# Patient Record
Sex: FEMALE | Race: WHITE | NOT HISPANIC OR LATINO | URBAN - METROPOLITAN AREA
[De-identification: names, ages, dates, MRNs, and addresses within clinical notes are randomized per-mention and may not be internally consistent; named-entity substitution may affect disease eponyms.]

---

## 2018-04-24 ENCOUNTER — INPATIENT (INPATIENT)
Facility: HOSPITAL | Age: 48
LOS: 2 days | Discharge: HOME | End: 2018-04-27
Attending: FAMILY MEDICINE | Admitting: FAMILY MEDICINE
Payer: COMMERCIAL

## 2018-04-24 VITALS — WEIGHT: 240.08 LBS | HEIGHT: 69 IN

## 2018-04-24 LAB
ADD ON TEST-SPECIMEN IN LAB: SIGNIFICANT CHANGE UP
ALBUMIN SERPL ELPH-MCNC: 4.2 G/DL — SIGNIFICANT CHANGE UP (ref 3.3–5)
ALP SERPL-CCNC: 87 U/L — SIGNIFICANT CHANGE UP (ref 40–120)
ALT FLD-CCNC: 49 U/L — SIGNIFICANT CHANGE UP (ref 12–78)
ANION GAP SERPL CALC-SCNC: 10 MMOL/L — SIGNIFICANT CHANGE UP (ref 5–17)
APTT BLD: 33.8 SEC — SIGNIFICANT CHANGE UP (ref 27.5–37.4)
AST SERPL-CCNC: 46 U/L — HIGH (ref 15–37)
BASOPHILS # BLD AUTO: 0.04 K/UL — SIGNIFICANT CHANGE UP (ref 0–0.2)
BASOPHILS NFR BLD AUTO: 0.4 % — SIGNIFICANT CHANGE UP (ref 0–2)
BILIRUB SERPL-MCNC: 0.4 MG/DL — SIGNIFICANT CHANGE UP (ref 0.2–1.2)
BUN SERPL-MCNC: 15 MG/DL — SIGNIFICANT CHANGE UP (ref 7–23)
CALCIUM SERPL-MCNC: 9.8 MG/DL — SIGNIFICANT CHANGE UP (ref 8.5–10.1)
CHLORIDE SERPL-SCNC: 106 MMOL/L — SIGNIFICANT CHANGE UP (ref 96–108)
CK SERPL-CCNC: 104 U/L — SIGNIFICANT CHANGE UP (ref 26–192)
CO2 SERPL-SCNC: 26 MMOL/L — SIGNIFICANT CHANGE UP (ref 22–31)
CREAT SERPL-MCNC: 1.43 MG/DL — HIGH (ref 0.5–1.3)
D DIMER BLD IA.RAPID-MCNC: <150 NG/ML DDU — SIGNIFICANT CHANGE UP
EOSINOPHIL # BLD AUTO: 0.08 K/UL — SIGNIFICANT CHANGE UP (ref 0–0.5)
EOSINOPHIL NFR BLD AUTO: 0.8 % — SIGNIFICANT CHANGE UP (ref 0–6)
GLUCOSE SERPL-MCNC: 124 MG/DL — HIGH (ref 70–99)
HCT VFR BLD CALC: 41.3 % — SIGNIFICANT CHANGE UP (ref 34.5–45)
HGB BLD-MCNC: 13.7 G/DL — SIGNIFICANT CHANGE UP (ref 11.5–15.5)
IMM GRANULOCYTES NFR BLD AUTO: 0.4 % — SIGNIFICANT CHANGE UP (ref 0–1.5)
INR BLD: 1.02 RATIO — SIGNIFICANT CHANGE UP (ref 0.88–1.16)
LYMPHOCYTES # BLD AUTO: 2.98 K/UL — SIGNIFICANT CHANGE UP (ref 1–3.3)
LYMPHOCYTES # BLD AUTO: 28.1 % — SIGNIFICANT CHANGE UP (ref 13–44)
MAGNESIUM SERPL-MCNC: 2 MG/DL — SIGNIFICANT CHANGE UP (ref 1.6–2.6)
MCHC RBC-ENTMCNC: 30.5 PG — SIGNIFICANT CHANGE UP (ref 27–34)
MCHC RBC-ENTMCNC: 33.2 GM/DL — SIGNIFICANT CHANGE UP (ref 32–36)
MCV RBC AUTO: 92 FL — SIGNIFICANT CHANGE UP (ref 80–100)
MONOCYTES # BLD AUTO: 1.01 K/UL — HIGH (ref 0–0.9)
MONOCYTES NFR BLD AUTO: 9.5 % — SIGNIFICANT CHANGE UP (ref 2–14)
NEUTROPHILS # BLD AUTO: 6.47 K/UL — SIGNIFICANT CHANGE UP (ref 1.8–7.4)
NEUTROPHILS NFR BLD AUTO: 60.8 % — SIGNIFICANT CHANGE UP (ref 43–77)
NRBC # BLD: 0 /100 WBCS — SIGNIFICANT CHANGE UP (ref 0–0)
PLATELET # BLD AUTO: 262 K/UL — SIGNIFICANT CHANGE UP (ref 150–400)
POTASSIUM SERPL-MCNC: 3.6 MMOL/L — SIGNIFICANT CHANGE UP (ref 3.5–5.3)
POTASSIUM SERPL-SCNC: 3.6 MMOL/L — SIGNIFICANT CHANGE UP (ref 3.5–5.3)
PROT SERPL-MCNC: 8.7 GM/DL — HIGH (ref 6–8.3)
PROTHROM AB SERPL-ACNC: 11 SEC — SIGNIFICANT CHANGE UP (ref 9.8–12.7)
RBC # BLD: 4.49 M/UL — SIGNIFICANT CHANGE UP (ref 3.8–5.2)
RBC # FLD: 12.4 % — SIGNIFICANT CHANGE UP (ref 10.3–14.5)
SODIUM SERPL-SCNC: 142 MMOL/L — SIGNIFICANT CHANGE UP (ref 135–145)
TROPONIN I SERPL-MCNC: 0.3 NG/ML — HIGH (ref 0.01–0.04)
TROPONIN I SERPL-MCNC: <0.015 NG/ML — SIGNIFICANT CHANGE UP (ref 0.01–0.04)
TSH SERPL-MCNC: 4.27 UU/ML — HIGH (ref 0.36–3.74)
WBC # BLD: 10.62 K/UL — HIGH (ref 3.8–10.5)
WBC # FLD AUTO: 10.62 K/UL — HIGH (ref 3.8–10.5)

## 2018-04-24 PROCEDURE — 71045 X-RAY EXAM CHEST 1 VIEW: CPT | Mod: 26

## 2018-04-24 PROCEDURE — 99291 CRITICAL CARE FIRST HOUR: CPT

## 2018-04-24 PROCEDURE — 93010 ELECTROCARDIOGRAM REPORT: CPT | Mod: 76

## 2018-04-24 RX ORDER — METOPROLOL TARTRATE 50 MG
0 TABLET ORAL
Qty: 0 | Refills: 0 | COMMUNITY

## 2018-04-24 RX ORDER — ACETAMINOPHEN 500 MG
650 TABLET ORAL EVERY 6 HOURS
Qty: 0 | Refills: 0 | Status: DISCONTINUED | OUTPATIENT
Start: 2018-04-24 | End: 2018-04-27

## 2018-04-24 RX ORDER — SODIUM CHLORIDE 9 MG/ML
1000 INJECTION INTRAMUSCULAR; INTRAVENOUS; SUBCUTANEOUS
Qty: 0 | Refills: 0 | Status: DISCONTINUED | OUTPATIENT
Start: 2018-04-24 | End: 2018-04-26

## 2018-04-24 RX ORDER — ASPIRIN/CALCIUM CARB/MAGNESIUM 324 MG
81 TABLET ORAL DAILY
Qty: 0 | Refills: 0 | Status: DISCONTINUED | OUTPATIENT
Start: 2018-04-24 | End: 2018-04-27

## 2018-04-24 RX ORDER — SODIUM CHLORIDE 9 MG/ML
3 INJECTION INTRAMUSCULAR; INTRAVENOUS; SUBCUTANEOUS ONCE
Qty: 0 | Refills: 0 | Status: COMPLETED | OUTPATIENT
Start: 2018-04-24 | End: 2018-04-24

## 2018-04-24 RX ORDER — ADENOSINE 3 MG/ML
6 INJECTION INTRAVENOUS ONCE
Qty: 0 | Refills: 0 | Status: COMPLETED | OUTPATIENT
Start: 2018-04-24 | End: 2018-04-24

## 2018-04-24 RX ADMIN — SODIUM CHLORIDE 3 MILLILITER(S): 9 INJECTION INTRAMUSCULAR; INTRAVENOUS; SUBCUTANEOUS at 17:38

## 2018-04-24 RX ADMIN — ADENOSINE 6 MILLIGRAM(S): 3 INJECTION INTRAVENOUS at 16:55

## 2018-04-24 NOTE — ED ADULT TRIAGE NOTE - CHIEF COMPLAINT QUOTE
pt states she has been in afib with rapid rate since approx 1430 today w/o relief. states weakness. no CP. HR in triage 202. pt upgraded.

## 2018-04-24 NOTE — H&P ADULT - NSHPPHYSICALEXAM_GEN_ALL_CORE
Vital Signs Last 24 Hrs  T(C): 36.9 (24 Apr 2018 23:06), Max: 37.1 (24 Apr 2018 16:43)  T(F): 98.5 (24 Apr 2018 23:06), Max: 98.8 (24 Apr 2018 19:17)  HR: 86 (24 Apr 2018 23:06) (85 - 190)  BP: 114/73 (24 Apr 2018 23:06) (111/65 - 134/90)  BP(mean): --  RR: 18 (24 Apr 2018 23:06) (16 - 18)  SpO2: 100% (24 Apr 2018 23:06) (98% - 100%)    GEN: appears comfortable  Neuro: AAOx3, nonfocal  HEENT: NC/AT, EOMI  Neck: no thyroidmegaly, no JVD  Cardiovascular: S1S2 present, regular rhythm, no murmur  Respiratory: breath sounds normal bilaterally, no wheezing, no rales, no rhonchi  Gastrointestinal: bowel sounds normal, soft, no abdominal tenderness  Musculoskeletal: no muscle tenderness  Extremities: No edema  Skin: No rash

## 2018-04-24 NOTE — ED ADULT NURSE REASSESSMENT NOTE - NS ED NURSE REASSESS COMMENT FT1
pt resting comfortably in bed with no acute distress noted. vss. awaiting for hospitalist. Will cont to monitor for safety and comfort.

## 2018-04-24 NOTE — ED PROVIDER NOTE - CRITICAL CARE PROVIDED
interpretation of diagnostic studies/direct patient care (not related to procedure)/documentation/additional history taking

## 2018-04-24 NOTE — H&P ADULT - ASSESSMENT
48 y.o. female with PMH AFib with ablation in 1990s, hx endometriosis s/p L ovary removal for chocolate cyst age 41 presents with palpitations. Pt is physical  and was throwing the ball when the palpitation started. Pt reports usually self resolves, but this time, it continued even while in the ED. Pt reports feeling lightheadedness during this time. Denies fever, chills, sore throat, abd pain, dysuria or diarrhea. Reports associated SOB and chest tightness. Currently, denies symptoms. Pt drinks 2 cups coffee daily.    #palpitations due to SVT  #hx atrial fibrillation  #elevated troponin likely demand ischemia from palpitation  -admit to tele  -serial cardiac enzymes and EKG  -echo  -NPO except med. ?stress test vs cath  -check thyroid panel  -CHADSVASc 1, ASA  -cardio consult, Dr Mireles    #LOIDA vs CKD  -Cr normal back in 2012  -iv fluid  -monitor renal function    #DVT ppx  -SCDs
DISCHARGE

## 2018-04-24 NOTE — ED PROVIDER NOTE - CARE PLAN
Principal Discharge DX:	SVT (supraventricular tachycardia)  Secondary Diagnosis:	Troponin level elevated

## 2018-04-24 NOTE — H&P ADULT - HISTORY OF PRESENT ILLNESS
48 y.o. female with PMH AFib with ablation in 1990s, hx endometriosis s/p L ovary removal for chocolate cyst age 41 presents with palpitations. Pt is physical  and was throwing the ball when the palpitation started. Pt reports usually self resolves, but this time, it continued even while in the ED. Pt reports feeling lightheadedness during this time. Denies fever, chills, sore throat, abd pain, dysuria or diarrhea. Reports associated SOB and chest tightness. Currently, denies symptoms. Pt drinks 2 cups coffee daily.    PMH: as above  PSH: as above  Social Hx: denies tobacco, 4-5 drinks  Family Hx: Father-aortic dissection; Mother-MI, TIA, RA  ROS: per HPI

## 2018-04-24 NOTE — ED ADULT NURSE NOTE - OBJECTIVE STATEMENT
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  Pt describes hx rapid afib and had ablation in early 1990's.  Pt states most recent episode 6 months ago, spontaneously converted while at work.  Pt reports this episode onset 2 hours prior to arrival to ED.  Pt describes slight sob, light headedness and chest pressure.  Pt hr 190's upon arrival to ED.

## 2018-04-25 DIAGNOSIS — I48.91 UNSPECIFIED ATRIAL FIBRILLATION: ICD-10-CM

## 2018-04-25 DIAGNOSIS — R74.8 ABNORMAL LEVELS OF OTHER SERUM ENZYMES: ICD-10-CM

## 2018-04-25 DIAGNOSIS — Z29.9 ENCOUNTER FOR PROPHYLACTIC MEASURES, UNSPECIFIED: ICD-10-CM

## 2018-04-25 DIAGNOSIS — I47.1 SUPRAVENTRICULAR TACHYCARDIA: ICD-10-CM

## 2018-04-25 LAB
ANION GAP SERPL CALC-SCNC: 7 MMOL/L — SIGNIFICANT CHANGE UP (ref 5–17)
BASOPHILS # BLD AUTO: 0.03 K/UL — SIGNIFICANT CHANGE UP (ref 0–0.2)
BASOPHILS NFR BLD AUTO: 0.5 % — SIGNIFICANT CHANGE UP (ref 0–2)
BUN SERPL-MCNC: 12 MG/DL — SIGNIFICANT CHANGE UP (ref 7–23)
CALCIUM SERPL-MCNC: 9 MG/DL — SIGNIFICANT CHANGE UP (ref 8.5–10.1)
CHLORIDE SERPL-SCNC: 110 MMOL/L — HIGH (ref 96–108)
CHOLEST SERPL-MCNC: 192 MG/DL — SIGNIFICANT CHANGE UP (ref 10–199)
CO2 SERPL-SCNC: 27 MMOL/L — SIGNIFICANT CHANGE UP (ref 22–31)
CREAT SERPL-MCNC: 1.08 MG/DL — SIGNIFICANT CHANGE UP (ref 0.5–1.3)
EOSINOPHIL # BLD AUTO: 0.13 K/UL — SIGNIFICANT CHANGE UP (ref 0–0.5)
EOSINOPHIL NFR BLD AUTO: 2.4 % — SIGNIFICANT CHANGE UP (ref 0–6)
GLUCOSE SERPL-MCNC: 98 MG/DL — SIGNIFICANT CHANGE UP (ref 70–99)
HCT VFR BLD CALC: 37.5 % — SIGNIFICANT CHANGE UP (ref 34.5–45)
HDLC SERPL-MCNC: 80 MG/DL — SIGNIFICANT CHANGE UP (ref 40–125)
HGB BLD-MCNC: 12.3 G/DL — SIGNIFICANT CHANGE UP (ref 11.5–15.5)
IMM GRANULOCYTES NFR BLD AUTO: 0 % — SIGNIFICANT CHANGE UP (ref 0–1.5)
LIPID PNL WITH DIRECT LDL SERPL: 101 MG/DL — SIGNIFICANT CHANGE UP
LYMPHOCYTES # BLD AUTO: 2.07 K/UL — SIGNIFICANT CHANGE UP (ref 1–3.3)
LYMPHOCYTES # BLD AUTO: 37.8 % — SIGNIFICANT CHANGE UP (ref 13–44)
MCHC RBC-ENTMCNC: 30.1 PG — SIGNIFICANT CHANGE UP (ref 27–34)
MCHC RBC-ENTMCNC: 32.8 GM/DL — SIGNIFICANT CHANGE UP (ref 32–36)
MCV RBC AUTO: 91.7 FL — SIGNIFICANT CHANGE UP (ref 80–100)
MONOCYTES # BLD AUTO: 0.67 K/UL — SIGNIFICANT CHANGE UP (ref 0–0.9)
MONOCYTES NFR BLD AUTO: 12.2 % — SIGNIFICANT CHANGE UP (ref 2–14)
NEUTROPHILS # BLD AUTO: 2.57 K/UL — SIGNIFICANT CHANGE UP (ref 1.8–7.4)
NEUTROPHILS NFR BLD AUTO: 47.1 % — SIGNIFICANT CHANGE UP (ref 43–77)
NRBC # BLD: 0 /100 WBCS — SIGNIFICANT CHANGE UP (ref 0–0)
PLATELET # BLD AUTO: 220 K/UL — SIGNIFICANT CHANGE UP (ref 150–400)
POTASSIUM SERPL-MCNC: 4 MMOL/L — SIGNIFICANT CHANGE UP (ref 3.5–5.3)
POTASSIUM SERPL-SCNC: 4 MMOL/L — SIGNIFICANT CHANGE UP (ref 3.5–5.3)
RBC # BLD: 4.09 M/UL — SIGNIFICANT CHANGE UP (ref 3.8–5.2)
RBC # FLD: 12.7 % — SIGNIFICANT CHANGE UP (ref 10.3–14.5)
SODIUM SERPL-SCNC: 144 MMOL/L — SIGNIFICANT CHANGE UP (ref 135–145)
T3 SERPL-MCNC: 98 NG/DL — SIGNIFICANT CHANGE UP (ref 80–200)
T4 AB SER-ACNC: 5.9 UG/DL — SIGNIFICANT CHANGE UP (ref 4.6–12)
TOTAL CHOLESTEROL/HDL RATIO MEASUREMENT: 2.4 RATIO — LOW (ref 3.3–7.1)
TRIGL SERPL-MCNC: 53 MG/DL — SIGNIFICANT CHANGE UP (ref 10–149)
TROPONIN I SERPL-MCNC: 0.23 NG/ML — HIGH (ref 0.01–0.04)
TROPONIN I SERPL-MCNC: 0.39 NG/ML — HIGH (ref 0.01–0.04)
WBC # BLD: 5.47 K/UL — SIGNIFICANT CHANGE UP (ref 3.8–10.5)
WBC # FLD AUTO: 5.47 K/UL — SIGNIFICANT CHANGE UP (ref 3.8–10.5)

## 2018-04-25 PROCEDURE — 93306 TTE W/DOPPLER COMPLETE: CPT | Mod: 26

## 2018-04-25 PROCEDURE — 99223 1ST HOSP IP/OBS HIGH 75: CPT

## 2018-04-25 PROCEDURE — 93010 ELECTROCARDIOGRAM REPORT: CPT

## 2018-04-25 RX ORDER — ONDANSETRON 8 MG/1
4 TABLET, FILM COATED ORAL EVERY 6 HOURS
Qty: 0 | Refills: 0 | Status: DISCONTINUED | OUTPATIENT
Start: 2018-04-25 | End: 2018-04-25

## 2018-04-25 RX ADMIN — Medication 650 MILLIGRAM(S): at 17:34

## 2018-04-25 RX ADMIN — SODIUM CHLORIDE 100 MILLILITER(S): 9 INJECTION INTRAMUSCULAR; INTRAVENOUS; SUBCUTANEOUS at 01:02

## 2018-04-25 RX ADMIN — Medication 81 MILLIGRAM(S): at 11:21

## 2018-04-25 RX ADMIN — SODIUM CHLORIDE 100 MILLILITER(S): 9 INJECTION INTRAMUSCULAR; INTRAVENOUS; SUBCUTANEOUS at 11:20

## 2018-04-25 NOTE — PROGRESS NOTE ADULT - ASSESSMENT
48 y.o. female with palpitations due to SVT    -echo  -NPO except med. ?stress test vs cath  -check thyroid panel  -CHADSVASc 1, ASA  -cardio consult, Dr Mireles    #LOIDA vs CKD  -Cr normal back in 2012  -iv fluid  -monitor renal function 48 y.o. female with palpitations due to SVT

## 2018-04-25 NOTE — PROGRESS NOTE ADULT - SUBJECTIVE AND OBJECTIVE BOX
CHIEF COMPLAINT: palpitations    SUBJECTIVE: 49yo F PMHx AFib with ablation in 1990s, hx endometriosis s/p L ovary removal for chocolate cyst age 41 presents with palpitations. Pt is physical  and was throwing the ball when the palpitation started. Pt reports usually self resolves, but this time, it continued even while in the ED. Pt reports feeling lightheadedness during this time. Denies fever, chills, sore throat, abd pain, dysuria or diarrhea. Reports associated SOB and chest tightness. Currently, denies symptoms. Pt drinks 2 cups coffee daily.    4/25/18 - Patient seen and examined at beside. No complaints, no longer having palpitations since admission. Admits no chest pain, sob, WYLIE, just a prolonged episode of palpitations. Patient has had episodes in the past, usually last under a minute and self resolve. No n/v, abdominal pain, weakness, fevers/chills. Case d/w Dr. Mireles, believes this patient is having SVT, will check TFT's and lyme titers. Echo today, f/u official report, cardio ablation tomorrow, NPO after midnight.    REVIEW OF SYSTEMS:    CONSTITUTIONAL: No weakness, fevers or chills  EYES/ENT: No visual changes;  No vertigo or throat pain   NECK: No pain or stiffness  RESPIRATORY: No cough, wheezing, hemoptysis; No shortness of breath  CARDIOVASCULAR: palpitations +, No chest pain  GASTROINTESTINAL: No abdominal or epigastric pain. No nausea, vomiting, or hematemesis; No diarrhea or constipation. No melena or hematochezia.  GENITOURINARY: No dysuria, frequency or hematuria  NEUROLOGICAL: No numbness or weakness  SKIN: No itching, burning, rashes, or lesions   All other review of systems is negative unless indicated above    Vital Signs Last 24 Hrs  T(C): 37.1 (25 Apr 2018 10:27), Max: 37.1 (24 Apr 2018 16:43)  T(F): 98.7 (25 Apr 2018 10:27), Max: 98.8 (24 Apr 2018 19:17)  HR: 84 (25 Apr 2018 10:27) (76 - 190)  BP: 118/62 (25 Apr 2018 10:27) (111/65 - 134/90)  BP(mean): --  RR: 18 (25 Apr 2018 10:27) (16 - 18)  SpO2: 94% (25 Apr 2018 10:27) (94% - 100%)    I&O's Summary      CAPILLARY BLOOD GLUCOSE          PHYSICAL EXAM:    Constitutional: NAD, awake and alert, well-developed  HEENT: PERR, EOMI, Normal Hearing, MMM  Neck: Soft and supple, No LAD, No JVD  Respiratory: Breath sounds are clear bilaterally, No wheezing, rales or rhonchi  Cardiovascular: S1 and S2, regular rate and rhythm, no Murmurs, gallops or rubs  Gastrointestinal: Bowel Sounds present, soft, nontender, nondistended, no guarding, no rebound  Extremities: No peripheral edema  Vascular: 2+ peripheral pulses  Neurological: A/O x 3, no focal deficits  Musculoskeletal: 5/5 strength b/l upper and lower extremities  Skin: No rashes    MEDICATIONS:  MEDICATIONS  (STANDING):  aspirin  chewable 81 milliGRAM(s) Oral daily  sodium chloride 0.9%. 1000 milliLiter(s) (100 mL/Hr) IV Continuous <Continuous>      LABS: All Labs Reviewed:                        12.3   5.47  )-----------( 220      ( 25 Apr 2018 06:03 )             37.5     04-25    144  |  110<H>  |  12  ----------------------------<  98  4.0   |  27  |  1.08    Ca    9.0      25 Apr 2018 06:03  Mg     2.0     04-24    TPro  8.7<H>  /  Alb  4.2  /  TBili  0.4  /  DBili  x   /  AST  46<H>  /  ALT  49  /  AlkPhos  87  04-24    PT/INR - ( 24 Apr 2018 16:38 )   PT: 11.0 sec;   INR: 1.02 ratio         PTT - ( 24 Apr 2018 16:38 )  PTT:33.8 sec  CARDIAC MARKERS ( 25 Apr 2018 06:03 )  0.234 ng/mL / x     / x     / x     / x      CARDIAC MARKERS ( 24 Apr 2018 23:56 )  0.385 ng/mL / x     / x     / x     / x      CARDIAC MARKERS ( 24 Apr 2018 19:54 )  0.296 ng/mL / x     / x     / x     / x      CARDIAC MARKERS ( 24 Apr 2018 16:38 )  <0.015 ng/mL / x     / 104 U/L / x     / x        Troponin I, Serum: 0.234: High Sensitivity Troponin and new reference  range effective 7/6/2016 ng/mL (04.25.18 @ 06:03)    Troponin I, Serum: 0.385: High Sensitivity Troponin and new reference  range effective 7/6/2016 ng/mL (04.24.18 @ 23:56)    Troponin I, Serum: 0.296: called inez lemus for first high tropi  04/24/18 20:37  High Sensitivity Troponin and new reference  range effective 7/6/2016 ng/mL (04.24.18 @ 19:54)      < from: Transthoracic Echocardiogram (04.25.18 @ 08:42) >  Normal appearing mitral valve structure and function.   Trace mitral regurgitation is present.   Normal appearing tricuspid valve structure and function.   Trace tricuspid valve regurgitation is present.   The left ventricle is normal in size, wall thickness, wall motion and   contractility.   Estimated left ventricular ejection fraction is 60-65 %.      < end of copied text >

## 2018-04-25 NOTE — CONSULT NOTE ADULT - SUBJECTIVE AND OBJECTIVE BOX
CHIEF COMPLAINT:  Patient is a 48y old  Female who presents with a chief complaint of palpitations (2018 23:52)      HPI: 18:  48 y.o. female well known to me with PMH SVT's with A-V armand ablation in  at [facility], hx endometriosis s/p L ovary removal for chocolate cyst at age 41, presents with palpitations.  She is physical  at the [facility] in [place] and was throwing the ball when the palpitation started.  She reports they are usually self resolves, but this time, it continued even while in the ED.  She reports feeling lightheadedness during this time. She has been getting more of these, usually lasting 20-30 minutes and resolves with either splashing water in her face and/or resting.  She denies fever, chills, sore throat, abd pain, dysuria or diarrhea.  She reports associated SOB and chest tightness with this current event. Currently, denies symptoms.  She drinks 2 cups coffee daily.  She had a normal stress echo in my office on 16 with normal LV size and systolic function and LVEF=60-65% with trace AI and mild (1+) MR and TR but no evidence for ischemia at that time.  She is now asymptomatic.  She does have Lopressor at home to use prn but has not been using it lately.    PMH: as above  PSH: as above  Social Hx: denies tobacco, 4-5 drinks  Family Hx: Father-?aortic dissection; Mother-MI, TIA, RA  ROS: per HPI (2018 23:52)        PMHx:  PAST MEDICAL & SURGICAL HISTORY:  SVT s/p AV armand ablation at [facility]-  endometrioma removal-GYN Dr ORION Lisa      ALLERGIES:  Allergies  No Known Allergies      REVIEW OF SYSTEMS:    CONSTITUTIONAL: No fevers or chills  EYES/ENT: No visual changes;  No vertigo or throat pain   NECK: No pain or stiffness  RESPIRATORY: No cough, wheezing, hemoptysis; No shortness of breath  GASTROINTESTINAL: No abdominal or epigastric pain. No nausea, vomiting, or hematemesis; No diarrhea or constipation. No melena or hematochezia.  GENITOURINARY: No dysuria, frequency or hematuria  NEUROLOGICAL: No numbness  SKIN: No itching, burning, rashes, or lesions   All other review of systems is negative unless indicated above    Vital Signs Last 24 Hrs  T(C): 36.7 (2018 04:49), Max: 37.1 (2018 16:43)  T(F): 98.1 (2018 04:49), Max: 98.8 (2018 19:17)  HR: 76 (2018 04:49) (76 - 190)  BP: 122/78 (2018 04:49) (111/65 - 134/90)  BP(mean): --  RR: 18 (2018 04:49) (16 - 18)  SpO2: 100% (2018 04:49) (97% - 100%)        PHYSICAL EXAM:   Constitutional: NAD, awake and alert, well-developed  HEENT: PERR, EOMI, Normal Hearing, MMM  Neck: Soft and supple, No LAD, No JVD  Respiratory: Breath sounds are clear bilaterally, No wheezing, rales or rhonchi  Cardiovascular: S1 and S2, regular rate and rhythm, soft LONG at LLSB and base as before, no gallops or rubs  Gastrointestinal: Bowel Sounds present, soft, nontender, nondistended, no guarding, no rebound  Extremities: No peripheral edema  Vascular: 2+ peripheral pulses  Neurological: A/O x 3, no focal deficits  Musculoskeletal: 5/5 strength b/l upper and lower extremities  Skin: No rashes      MEDICATIONS  (STANDING):  aspirin  chewable 81 milliGRAM(s) Oral daily  sodium chloride 0.9%. 1000 milliLiter(s) (100 mL/Hr) IV Continuous <Continuous>      LABS: All Labs Reviewed:                        12.3   5.47  )-----------( 220      ( 2018 06:03 )             37.5     04-25    144  |  110<H>  |  12  ----------------------------<  98  4.0   |  27  |  1.08    Ca    9.0      2018 06:03  Mg     2.0     04-24    TPro  8.7<H>  /  Alb  4.2  /  TBili  0.4  /  DBili  x   /  AST  46<H>  /  ALT  49  /  AlkPhos  87  04-24    PT/INR - ( 2018 16:38 )   PT: 11.0 sec;   INR: 1.02 ratio         PTT - ( 2018 16:38 )  PTT:33.8 sec  CARDIAC MARKERS ( 2018 06:03 )  0.234 ng/mL / x     / x     / x     / x      CARDIAC MARKERS ( 2018 23:56 )  0.385 ng/mL / x     / x     / x     / x      CARDIAC MARKERS ( 2018 19:54 )  0.296 ng/mL / x     / x     / x     / x      CARDIAC MARKERS ( 2018 16:38 )  <0.015 ng/mL / x     / 104 U/L / x     / x            BLOOD CULTURES:   LIPID PROFILE     RADIOLOGY:      EK18 @ 17:09:15 (after IV Adenosine)  NSR, WNL    18 @16:36;18  SVT with NSSTTwave changes    TELEMETRY:  SVT=>NSR    ECHO:  Normal stress echo in my office on 16 with normal LV size and systolic function and LVEF=60-65% with trace AI and mild (1+) MR and TR but no evidence for ischemia at that time.

## 2018-04-25 NOTE — PROGRESS NOTE ADULT - PROBLEM SELECTOR PLAN 1
Currently NSR on tele    -F/u Lyme Titers  -TFT's within normal limit, elevated TSH  -Cardio note appreciated, Cardioablation tomorrow  -Echocardiogram grossly normal Currently NSR on tele    -F/u Lyme Titers  -TFT's within normal limit, elevated TSH  -Cardio note appreciated, Cardioablation tomorrow  -EP note appreciated, NO AVN blockers prior to ablation  -Echocardiogram grossly normal

## 2018-04-25 NOTE — CONSULT NOTE ADULT - SUBJECTIVE AND OBJECTIVE BOX
HPI:  48 y.o. female with PMH of SVT s/p ablation in 1990s, hx endometriosis s/p L ovary removal presents with palpitations lasting approximately 2 hours. Pt is physical  and was throwing the ball when the palpitations started. Pt reports usually self resolves after 20-30 min, but this time, it continued even while in the ED. Pt reports feeling lightheadedness during this time. Reports associated SOB and chest tightness.  Denies fever, chills, sore throat, abd pain, dysuria or diarrhea.  Currently, denies symptoms. Pt drinks 2 cups coffee daily.    4/25/18: EP asked to consult with pt for recurrent SVT. In ER SVT to SR after Adenosine 6 mg IVP given.    TELE: currently SR 70-90  EKG:      PMH: as above  PSH: as above  Social Hx: denies tobacco, 4-5 drinks  Family Hx: Father-aortic dissection; Mother-MI, TIA, RA      MEDICATIONS  (STANDING):  aspirin  chewable 81 milliGRAM(s) Oral daily  sodium chloride 0.9%. 1000 milliLiter(s) (100 mL/Hr) IV Continuous <Continuous>    MEDICATIONS  (PRN):  acetaminophen   Tablet 650 milliGRAM(s) Oral every 6 hours PRN For Temp greater than 38 C (100.4 F), mild pain, HA      Allergies    No Known Allergies        Vital Signs Last 24 Hrs  T(C): 36.7 (25 Apr 2018 04:49), Max: 37.1 (24 Apr 2018 16:43)  T(F): 98.1 (25 Apr 2018 04:49), Max: 98.8 (24 Apr 2018 19:17)  HR: 76 (25 Apr 2018 04:49) (76 - 190)  BP: 122/78 (25 Apr 2018 04:49) (111/65 - 134/90)  BP(mean): --  RR: 18 (25 Apr 2018 04:49) (16 - 18)  SpO2: 100% (25 Apr 2018 04:49) (97% - 100%)    REVIEW OF SYSTEMS:    CONSTITUTIONAL:  As per HPI.  HEENT:  Eyes:  No diplopia or blurred vision. ENT:  No earache, sore throat or runny nose.  CARDIOVASCULAR:  No pressure, squeezing, strangling, tightness, heaviness or aching about the chest, neck, axilla or epigastrium.  RESPIRATORY:  No cough, shortness of breath, PND or orthopnea.  GASTROINTESTINAL:  No nausea, vomiting or diarrhea.  GENITOURINARY:  No dysuria, frequency or urgency.  MUSCULOSKELETAL:  As per HPI.  SKIN:  No change in skin, hair or nails.  NEUROLOGIC:  No paresthesias, fasciculations, seizures or weakness.  PSYCHIATRIC:  No disorder of thought or mood.  ENDOCRINE:  No heat or cold intolerance, polyuria or polydipsia.  HEMATOLOGICAL:  No easy bruising or bleedings:  .     PHYSICAL EXAMINATION:    GENERAL APPEARANCE:  Pt. is not currently dyspneic, in no distress. Pt. is alert, oriented, and pleasant.  HEENT:  NC/AT, Pupils are normal and react normally. No icterus. Mucous membranes well colored.  NECK:  Supple. No lymphadenopathy. Jugular venous pressure not elevated. Carotids equal.   HEART:   The cardiac impulse has a normal quality. There are no murmurs, rubs or gallops noted  CHEST:  Chest is clear to auscultation. Normal respiratory effort.  ABDOMEN:  Soft and nontender.   EXTREMITIES:  There is no edema.   SKIN:  No rash or significant lesions are noted.    LABS:                        12.3   5.47  )-----------( 220      ( 25 Apr 2018 06:03 )             37.5     04-25    144  |  110<H>  |  12  ----------------------------<  98  4.0   |  27  |  1.08    Ca    9.0      25 Apr 2018 06:03  Mg     2.0     04-24    TPro  8.7<H>  /  Alb  4.2  /  TBili  0.4  /  DBili  x   /  AST  46<H>  /  ALT  49  /  AlkPhos  87  04-24    LIVER FUNCTIONS - ( 24 Apr 2018 16:38 )  Alb: 4.2 g/dL / Pro: 8.7 gm/dL / ALK PHOS: 87 U/L / ALT: 49 U/L / AST: 46 U/L / GGT: x           PT/INR - ( 24 Apr 2018 16:38 )   PT: 11.0 sec;   INR: 1.02 ratio         PTT - ( 24 Apr 2018 16:38 )  PTT:33.8 sec  CARDIAC MARKERS ( 25 Apr 2018 06:03 )  0.234 ng/mL / x     / x     / x     / x      CARDIAC MARKERS ( 24 Apr 2018 23:56 )  0.385 ng/mL / x     / x     / x     / x      CARDIAC MARKERS ( 24 Apr 2018 19:54 )  0.296 ng/mL / x     / x     / x     / x      CARDIAC MARKERS ( 24 Apr 2018 16:38 )  <0.015 ng/mL / x     / 104 U/L / x     / x          Stress echo as outpatient in 8/2017: normal systolic fxn, LVEF 60-65%  Repeat 2D echo pending today. HPI:  48 y.o. female with PMH of SVT s/p ablation in 1990s, hx endometriosis s/p L ovary removal presents with palpitations lasting approximately 2 hours. Pt is physical  and was throwing the ball when the palpitations started. Pt reports usually self resolves after 20-30 min, but this time, it continued even while in the ED. Pt reports feeling lightheadedness during this time. Reports associated SOB and chest tightness.  Denies fever, chills, sore throat, abd pain, dysuria or diarrhea.  Currently, denies symptoms. Pt drinks 2 cups coffee daily.    4/25/18: EP asked to consult with pt for recurrent SVT. In ER, SVT to SR after Adenosine 6 mg IVP given.    TELE: currently SR 70-90bpm, no further SVT overnight  EKG: in ER  bpm, after adenosine, NSR 95 bpm.      PMH: as above  PSH: as above  Social Hx: denies tobacco, 4-5 drinks  Family Hx: Father-aortic dissection; Mother-MI, TIA, RA      MEDICATIONS  (STANDING):  aspirin  chewable 81 milliGRAM(s) Oral daily  sodium chloride 0.9%. 1000 milliLiter(s) (100 mL/Hr) IV Continuous <Continuous>    MEDICATIONS  (PRN):  acetaminophen   Tablet 650 milliGRAM(s) Oral every 6 hours PRN For Temp greater than 38 C (100.4 F), mild pain, HA      Allergies    No Known Allergies        Vital Signs Last 24 Hrs  T(C): 36.7 (25 Apr 2018 04:49), Max: 37.1 (24 Apr 2018 16:43)  T(F): 98.1 (25 Apr 2018 04:49), Max: 98.8 (24 Apr 2018 19:17)  HR: 76 (25 Apr 2018 04:49) (76 - 190)  BP: 122/78 (25 Apr 2018 04:49) (111/65 - 134/90)  BP(mean): --  RR: 18 (25 Apr 2018 04:49) (16 - 18)  SpO2: 100% (25 Apr 2018 04:49) (97% - 100%)    REVIEW OF SYSTEMS:    CONSTITUTIONAL:  As per HPI.  HEENT:  Eyes:  No diplopia or blurred vision. ENT:  No earache, sore throat or runny nose.  CARDIOVASCULAR:  No pressure, squeezing, strangling, tightness, heaviness or aching about the chest, neck, axilla or epigastrium.  RESPIRATORY:  No cough, shortness of breath, PND or orthopnea.  GASTROINTESTINAL:  No nausea, vomiting or diarrhea.  GENITOURINARY:  No dysuria, frequency or urgency.  MUSCULOSKELETAL:  As per HPI.  SKIN:  No change in skin, hair or nails.  NEUROLOGIC:  No paresthesias, fasciculations, seizures or weakness.  PSYCHIATRIC:  No disorder of thought or mood.  ENDOCRINE:  No heat or cold intolerance, polyuria or polydipsia.  HEMATOLOGICAL:  No easy bruising or bleedings:  .     PHYSICAL EXAMINATION:    GENERAL APPEARANCE:  Pt. is not currently dyspneic, in no distress. Pt. is alert, oriented, and pleasant.  HEENT:  NC/AT, Pupils are normal and react normally. No icterus. Mucous membranes well colored.  NECK:  Supple. No lymphadenopathy. Jugular venous pressure not elevated. Carotids equal.   HEART:   The cardiac impulse has a normal quality. There are no murmurs, rubs or gallops noted  CHEST:  Chest is clear to auscultation. Normal respiratory effort.  ABDOMEN:  Soft and nontender.   EXTREMITIES:  There is no edema.   SKIN:  No rash or significant lesions are noted.    LABS:                        12.3   5.47  )-----------( 220      ( 25 Apr 2018 06:03 )             37.5     04-25    144  |  110<H>  |  12  ----------------------------<  98  4.0   |  27  |  1.08    Ca    9.0      25 Apr 2018 06:03  Mg     2.0     04-24    TPro  8.7<H>  /  Alb  4.2  /  TBili  0.4  /  DBili  x   /  AST  46<H>  /  ALT  49  /  AlkPhos  87  04-24    LIVER FUNCTIONS - ( 24 Apr 2018 16:38 )  Alb: 4.2 g/dL / Pro: 8.7 gm/dL / ALK PHOS: 87 U/L / ALT: 49 U/L / AST: 46 U/L / GGT: x           PT/INR - ( 24 Apr 2018 16:38 )   PT: 11.0 sec;   INR: 1.02 ratio         PTT - ( 24 Apr 2018 16:38 )  PTT:33.8 sec  CARDIAC MARKERS ( 25 Apr 2018 06:03 )  0.234 ng/mL / x     / x     / x     / x      CARDIAC MARKERS ( 24 Apr 2018 23:56 )  0.385 ng/mL / x     / x     / x     / x      CARDIAC MARKERS ( 24 Apr 2018 19:54 )  0.296 ng/mL / x     / x     / x     / x      CARDIAC MARKERS ( 24 Apr 2018 16:38 )  <0.015 ng/mL / x     / 104 U/L / x     / x          Stress echo as outpatient in 8/2017: normal systolic fxn, LVEF 60-65%  Repeat 2D echo pending today.

## 2018-04-25 NOTE — CONSULT NOTE ADULT - ASSESSMENT
A/P:  Recurrent SVT with history of ablation in 1990's, pt has had increased episodes of SVT and longer in duration- she is usually able to self terminate them, rarely uses Lopressor.   2D echo pending.  Continue to monitor tele.  NPO after MN for repeat SVT ablation on Thursday.  Avoid use of any AV armand blockers prior to ablation.  Plan discussed with pt and she wishes to proceed.

## 2018-04-25 NOTE — CONSULT NOTE ADULT - ASSESSMENT
4/25/18:  Pt with above history and recurrent SVT using Lopressor prn at home but not using it lately despite more frequent but short lived SVT's.  Mild bump in Troponin most likely due to demand ischemia and not ACS.  Will repeat an echo and have EP see the pt for possible repeat SVT ablation.  Continue to avoid caffeine and will check TFT's and lyme titer (doubt).  Continue as outlined by medicine etal.  Will follow.

## 2018-04-25 NOTE — PROVIDER CONTACT NOTE (OTHER) - NAME OF MD/NP/PA/DO NOTIFIED:
EP, SPOKE WITH ABBY.
RAJESH, SPOKE WITH MD AND AWARE OF CONSULT.
CYRUS MENA
Patient was seen by Nicole Evans

## 2018-04-26 LAB
ANION GAP SERPL CALC-SCNC: 8 MMOL/L — SIGNIFICANT CHANGE UP (ref 5–17)
B BURGDOR C6 AB SER-ACNC: NEGATIVE — SIGNIFICANT CHANGE UP
B BURGDOR IGG+IGM SER-ACNC: 0.21 INDEX — SIGNIFICANT CHANGE UP (ref 0.01–0.89)
BUN SERPL-MCNC: 10 MG/DL — SIGNIFICANT CHANGE UP (ref 7–23)
CALCIUM SERPL-MCNC: 9.1 MG/DL — SIGNIFICANT CHANGE UP (ref 8.5–10.1)
CHLORIDE SERPL-SCNC: 111 MMOL/L — HIGH (ref 96–108)
CO2 SERPL-SCNC: 24 MMOL/L — SIGNIFICANT CHANGE UP (ref 22–31)
CREAT SERPL-MCNC: 0.89 MG/DL — SIGNIFICANT CHANGE UP (ref 0.5–1.3)
GLUCOSE SERPL-MCNC: 99 MG/DL — SIGNIFICANT CHANGE UP (ref 70–99)
HCG UR QL: NEGATIVE — SIGNIFICANT CHANGE UP
HCT VFR BLD CALC: 37.2 % — SIGNIFICANT CHANGE UP (ref 34.5–45)
HGB BLD-MCNC: 12.2 G/DL — SIGNIFICANT CHANGE UP (ref 11.5–15.5)
MCHC RBC-ENTMCNC: 30.3 PG — SIGNIFICANT CHANGE UP (ref 27–34)
MCHC RBC-ENTMCNC: 32.8 GM/DL — SIGNIFICANT CHANGE UP (ref 32–36)
MCV RBC AUTO: 92.3 FL — SIGNIFICANT CHANGE UP (ref 80–100)
NRBC # BLD: 0 /100 WBCS — SIGNIFICANT CHANGE UP (ref 0–0)
PLATELET # BLD AUTO: 207 K/UL — SIGNIFICANT CHANGE UP (ref 150–400)
POTASSIUM SERPL-MCNC: 4.1 MMOL/L — SIGNIFICANT CHANGE UP (ref 3.5–5.3)
POTASSIUM SERPL-SCNC: 4.1 MMOL/L — SIGNIFICANT CHANGE UP (ref 3.5–5.3)
RBC # BLD: 4.03 M/UL — SIGNIFICANT CHANGE UP (ref 3.8–5.2)
RBC # FLD: 12.5 % — SIGNIFICANT CHANGE UP (ref 10.3–14.5)
SODIUM SERPL-SCNC: 143 MMOL/L — SIGNIFICANT CHANGE UP (ref 135–145)
WBC # BLD: 5.64 K/UL — SIGNIFICANT CHANGE UP (ref 3.8–10.5)
WBC # FLD AUTO: 5.64 K/UL — SIGNIFICANT CHANGE UP (ref 3.8–10.5)

## 2018-04-26 PROCEDURE — 93613 INTRACARDIAC EPHYS 3D MAPG: CPT | Mod: 59

## 2018-04-26 PROCEDURE — 93623 PRGRMD STIMJ&PACG IV RX NFS: CPT | Mod: 26

## 2018-04-26 PROCEDURE — 93653 COMPRE EP EVAL TX SVT: CPT

## 2018-04-26 PROCEDURE — 93010 ELECTROCARDIOGRAM REPORT: CPT

## 2018-04-26 PROCEDURE — 93621 COMP EP EVL L PAC&REC C SINS: CPT | Mod: 26

## 2018-04-26 RX ORDER — ISOPROTERENOL HYDROCHLORIDE 1 MG/5ML
1 INJECTION, SOLUTION INTRACARDIAC; INTRAMUSCULAR; INTRAVENOUS; SUBCUTANEOUS
Qty: 1 | Refills: 0 | Status: DISCONTINUED | OUTPATIENT
Start: 2018-04-26 | End: 2018-04-26

## 2018-04-26 RX ORDER — DOCUSATE SODIUM 100 MG
100 CAPSULE ORAL
Qty: 0 | Refills: 0 | Status: DISCONTINUED | OUTPATIENT
Start: 2018-04-26 | End: 2018-04-27

## 2018-04-26 RX ADMIN — Medication 81 MILLIGRAM(S): at 12:41

## 2018-04-26 RX ADMIN — Medication 100 MILLIGRAM(S): at 20:29

## 2018-04-26 NOTE — PROGRESS NOTE ADULT - ATTENDING COMMENTS
Patient seen and examined with Jarrod Joyce, Edward Kayserian, Rylee Courtney and PA Susana Witt on the Family Medicine Teaching Service.  Agree with history, physical, labs and plan which were reviewed in detail.
one exam- comfortable   -cvs-[id number] normal     A/P    #possible EP study and ablation tomorrow, no AV armand blocking agent

## 2018-04-26 NOTE — PROGRESS NOTE ADULT - PROBLEM SELECTOR PLAN 1
Currently NSR on tele    - Lyme Titers non contributory  -TFT's within normal limit, elevated TSH  -Cardio note appreciated, f/u post cardioablation  -EP note appreciated, NO AVN blockers prior to ablation  -Echocardiogram grossly normal

## 2018-04-26 NOTE — PROGRESS NOTE ADULT - ASSESSMENT
4/25/18:  Pt with above history and recurrent SVT using Lopressor prn at home but not using it lately despite more frequent but short lived SVT's.  Mild bump in Troponin most likely due to demand ischemia and not ACS.  Will repeat an echo and have EP see the pt for possible repeat SVT ablation.  Continue to avoid caffeine and will check TFT's and lyme titer (doubt).  Continue as outlined by medicine etal.  Will follow.    4/26/18:  No recurrent SVT.  NSR on the monitor.  No anginal chest pains.  Slight bump in Troponin levels coming down, related to demand ischemia.  Unremarkable echo with normal LV systolic function and insignificant valvular findings as noted below.  TFT's normal and Lyme titer (-).  For SVT ablation this afternoon.  No new cardiac symptoms or new cardiac issues.  Continue as per EP service and medicine etal.  Will follow.

## 2018-04-26 NOTE — PROGRESS NOTE ADULT - SUBJECTIVE AND OBJECTIVE BOX
CHIEF COMPLAINT: palpitations    SUBJECTIVE: 47yo F PMHx AFib with ablation in 1990s, hx endometriosis s/p L ovary removal for chocolate cyst age 41 presents with palpitations. Pt is physical  and was throwing the ball when the palpitation started. Pt reports usually self resolves, but this time, it continued even while in the ED. Pt reports feeling lightheadedness during this time. Denies fever, chills, sore throat, abd pain, dysuria or diarrhea. Reports associated SOB and chest tightness. Currently, denies symptoms. Pt drinks 2 cups coffee daily.    4/25/18 - Patient seen and examined at beside. No complaints, no longer having palpitations since admission. Patient for cardioablation today,cardiology note appreciated, TFT's Lyme non-contributory. Will f/u s/p ablation    REVIEW OF SYSTEMS:    CONSTITUTIONAL: No weakness, fevers or chills  EYES/ENT: No visual changes;  No vertigo or throat pain   NECK: No pain or stiffness  RESPIRATORY: No cough, wheezing, hemoptysis; No shortness of breath  CARDIOVASCULAR: palpitations +, No chest pain  GASTROINTESTINAL: No abdominal or epigastric pain. No nausea, vomiting, or hematemesis; No diarrhea or constipation. No melena or hematochezia.  GENITOURINARY: No dysuria, frequency or hematuria  NEUROLOGICAL: No numbness or weakness  SKIN: No itching, burning, rashes, or lesions   All other review of systems is negative unless indicated above    Vital Signs Last 24 Hrs  T(C): 36.8 (26 Apr 2018 09:52), Max: 36.9 (25 Apr 2018 16:05)  T(F): 98.2 (26 Apr 2018 09:52), Max: 98.5 (25 Apr 2018 16:05)  HR: 62 (26 Apr 2018 09:52) (62 - 80)  BP: 114/70 (26 Apr 2018 09:52) (100/74 - 114/70)  BP(mean): --  RR: 18 (26 Apr 2018 09:52) (18 - 18)  SpO2: 98% (26 Apr 2018 09:52) (95% - 98%)    PHYSICAL EXAM:    Constitutional: NAD, awake and alert, well-developed  HEENT: PERR, EOMI, Normal Hearing, MMM  Neck: Soft and supple, No LAD, No JVD  Respiratory: Breath sounds are clear bilaterally, No wheezing, rales or rhonchi  Cardiovascular: S1 and S2, regular rate and rhythm, no Murmurs, gallops or rubs  Gastrointestinal: Bowel Sounds present, soft, nontender, nondistended, no guarding, no rebound  Extremities: No peripheral edema  Vascular: 2+ peripheral pulses  Neurological: A/O x 3, no focal deficits  Musculoskeletal: 5/5 strength b/l upper and lower extremities  Skin: No rashes    MEDICATIONS  (STANDING):  aspirin  chewable 81 milliGRAM(s) Oral daily  docusate sodium 100 milliGRAM(s) Oral two times a day  isoproterenol Infusion 1 MICROgram(s)/Min (15 mL/Hr) IV Continuous <Continuous>  sodium chloride 0.9%. 1000 milliLiter(s) (100 mL/Hr) IV Continuous <Continuous>    MEDICATIONS  (PRN):  acetaminophen   Tablet 650 milliGRAM(s) Oral every 6 hours PRN For Temp greater than 38 C (100.4 F), mild pain, HA        LABS: All Labs Reviewed:                                   12.2   5.64  )-----------( 207      ( 26 Apr 2018 05:37 )             37.2   04-26    143  |  111<H>  |  10  ----------------------------<  99  4.1   |  24  |  0.89    Ca    9.1      26 Apr 2018 05:37  Mg     2.0     04-24    TPro  8.7<H>  /  Alb  4.2  /  TBili  0.4  /  DBili  x   /  AST  46<H>  /  ALT  49  /  AlkPhos  87  04-24    PT/INR - ( 24 Apr 2018 16:38 )   PT: 11.0 sec;   INR: 1.02 ratio         PTT - ( 24 Apr 2018 16:38 )  PTT:33.8 sec  CARDIAC MARKERS ( 25 Apr 2018 06:03 )  0.234 ng/mL / x     / x     / x     / x      CARDIAC MARKERS ( 24 Apr 2018 23:56 )  0.385 ng/mL / x     / x     / x     / x      CARDIAC MARKERS ( 24 Apr 2018 19:54 )  0.296 ng/mL / x     / x     / x     / x      CARDIAC MARKERS ( 24 Apr 2018 16:38 )  <0.015 ng/mL / x     / 104 U/L / x     / x        Troponin I, Serum: 0.234: High Sensitivity Troponin and new reference  range effective 7/6/2016 ng/mL (04.25.18 @ 06:03)    Troponin I, Serum: 0.385: High Sensitivity Troponin and new reference  range effective 7/6/2016 ng/mL (04.24.18 @ 23:56)    Troponin I, Serum: 0.296: called inez lemus for first high tropi  04/24/18 20:37  High Sensitivity Troponin and new reference  range effective 7/6/2016 ng/mL (04.24.18 @ 19:54)      < from: Transthoracic Echocardiogram (04.25.18 @ 08:42) >  Normal appearing mitral valve structure and function.   Trace mitral regurgitation is present.   Normal appearing tricuspid valve structure and function.   Trace tricuspid valve regurgitation is present.   The left ventricle is normal in size, wall thickness, wall motion and   contractility.   Estimated left ventricular ejection fraction is 60-65 %.      < end of copied text >

## 2018-04-26 NOTE — CHART NOTE - NSCHARTNOTEFT_GEN_A_CORE
47 yo F s/p catheter ablation for SVT POD #0.    Pt was seen at bedside earlier this evening around 2030. She was lying comfortably in bed and was following post cath protocol -remaining still and avoiding movement. She has been tolerating PO well and denies any acute complaints. She denies any CP, HA, dizziness or palpitations. She denies any pain at site of catheter insertion.     Vital Signs Last 24 Hrs  T(C): 36.3 (26 Apr 2018 21:00), Max: 36.9 (26 Apr 2018 14:24)  T(F): 97.4 (26 Apr 2018 21:00), Max: 98.5 (26 Apr 2018 14:24)  HR: 85 (26 Apr 2018 21:00) (62 - 86)  BP: 111/72 (26 Apr 2018 21:00) (89/55 - 125/83)  BP(mean): 81 (26 Apr 2018 21:00) (62 - 86)  RR: 13 (26 Apr 2018 21:00) (13 - 26)  SpO2: 95% (26 Apr 2018 17:55) (95% - 98%)    PHYSICAL EXAM: Deferred      Yoly Pierre MD PGY-1

## 2018-04-26 NOTE — PACU DISCHARGE NOTE - COMMENTS
Transfer via stretcher and on cardiac monitor with RN. report given to   . Transfer via stretcher and on cardiac monitor with RN. report given to  RAY Hauser RN  .

## 2018-04-26 NOTE — PROGRESS NOTE ADULT - SUBJECTIVE AND OBJECTIVE BOX
CHIEF COMPLAINT:  Patient is a 48y old  Female who presents with a chief complaint of palpitations (2018 23:52)      HPI: 18:  48 y.o. female well known to me with PMH SVT's with A-V armand ablation in  at [facility], hx endometriosis s/p L ovary removal for chocolate cyst at age 41, presents with palpitations.  She is physical  at the [facility] in [place] and was throwing the ball when the palpitation started.  She reports they are usually self resolves, but this time, it continued even while in the ED.  She reports feeling lightheadedness during this time. She has been getting more of these, usually lasting 20-30 minutes and resolves with either splashing water in her face and/or resting.  She denies fever, chills, sore throat, abd pain, dysuria or diarrhea.  She reports associated SOB and chest tightness with this current event. Currently, denies symptoms.  She drinks 2 cups coffee daily.  She had a normal stress echo in my office on 16 with normal LV size and systolic function and LVEF=60-65% with trace AI and mild (1+) MR and TR but no evidence for ischemia at that time.  She is now asymptomatic.  She does have Lopressor at home to use prn but has not been using it lately.    18:  No recurrent SVT.  NSR on the monitor.  No anginal chest pains.  Slight bump in Troponin levels coming down, related to demand ischemia.  Unremarkable echo with normal LV systolic function and insignificant valvular findings as noted below.  TFT's normal and Lyme titer (-).  For SVT ablation this afternoon.  No new cardiac symptoms or new cardiac issues.    PMH: as above  PSH: as above  Social Hx: denies tobacco, 4-5 drinks  Family Hx: Father-?aortic dissection; Mother-MI, TIA, RA  ROS: per HPI (2018 23:52)        PMHx:  PAST MEDICAL & SURGICAL HISTORY:  SVT s/p AV armand ablation at [facility]-  endometrioma removal-GYN Dr ORION Lisa      ALLERGIES:  Allergies  No Known Allergies      REVIEW OF SYSTEMS:    CONSTITUTIONAL: No fevers or chills  EYES/ENT: No visual changes;  No vertigo or throat pain   NECK: No pain or stiffness  RESPIRATORY: No cough, wheezing, hemoptysis; No shortness of breath  GASTROINTESTINAL: No abdominal or epigastric pain. No nausea, vomiting, or hematemesis; No diarrhea or constipation. No melena or hematochezia.  GENITOURINARY: No dysuria, frequency or hematuria  NEUROLOGICAL: No numbness  SKIN: No itching, burning, rashes, or lesions   All other review of systems is negative unless indicated above    Vital Signs Last 24 Hrs  T(C): 36.7 (2018 04:53), Max: 37.1 (2018 10:27)  T(F): 98.1 (2018 04:53), Max: 98.7 (2018 10:27)  HR: 67 (2018 04:53) (67 - 84)  BP: 100/74 (2018 04:53) (100/74 - 118/62)  BP(mean): --  RR: 18 (2018 04:53) (18 - 18)  SpO2: 95% (2018 04:53) (94% - 95%)      PHYSICAL EXAM:   Constitutional: NAD, awake and alert, well-developed  HEENT: PERR, EOMI, Normal Hearing, MMM  Neck: Soft and supple, No LAD, No JVD  Respiratory: Breath sounds are clear bilaterally, No wheezing, rales or rhonchi  Cardiovascular: S1 and S2, regular rate and rhythm, soft LONG at LLSB and base as before, no gallops or rubs  Gastrointestinal: Bowel Sounds present, soft, nontender, nondistended, no guarding, no rebound  Extremities: No peripheral edema  Vascular: 2+ peripheral pulses  Neurological: A/O x 3, no focal deficits  Musculoskeletal: 5/5 strength b/l upper and lower extremities  Skin: No rashes      MEDICATIONS  (STANDING):  aspirin  chewable 81 milliGRAM(s) Oral daily  sodium chloride 0.9%. 1000 milliLiter(s) (100 mL/Hr) IV Continuous <Continuous>      LABS: All Labs Reviewed:                        12.2   564  )-----------( 207      ( 2018 05:37 )             37.2     04-26    143  |  111<H>  |  10  ----------------------------<  99  4.1   |  24  |  0.89    Ca    9.1      2018 05:37  Mg     2.0         TPro  8.7<H>  /  Alb  4.2  /  TBili  0.4  /  DBili  x   /  AST  46<H>  /  ALT  49  /  AlkPhos  87      PT/INR - ( 2018 16:38 )   PT: 11.0 sec;   INR: 1.02 ratio         PTT - ( 2018 16:38 )  PTT:33.8 sec  CARDIAC MARKERS ( 2018 06:03 )  0.234 ng/mL / x     / x     / x     / x      CARDIAC MARKERS ( 2018 23:56 )  0.385 ng/mL / x     / x     / x     / x      CARDIAC MARKERS ( 2018 19:54 )  0.296 ng/mL / x     / x     / x     / x      CARDIAC MARKERS ( 2018 16:38 )  <0.015 ng/mL / x     / 104 U/L / x     / x        Borrelia burgdorferi IgG/IgM Antibodies (18 @ 15:48)    LYME IgG/IgM Antibodies Result: 0.21 Index    Lyme C6 Interpretation: Negative: METHOD: MANJEET      Reference Range: (values expressed as Lyme Index )                                < 0.90        Negative                                0.90 - 1.09   Equivocal                                >= 1.10        Positive      BLOOD CULTURES:     LIPID PROFILE lipid profile                           @ 06:03  cholesterol           192 mg/dL  Direct LDL            101 mg/dL  HDL cholesterol       80 mg/dL  HDL/Total cholesterol --  Triglycerides, serum  53 mg/dL      RADIOLOGY:    CXR: 18:  Normal AP chest.     EK18 @ 17:09:15 (after IV Adenosine)  NSR, WNL    18 @16:36;18  SVT with NSSTTwave changes    TELEMETRY:  SVT=>NSR    ECHO:  Normal stress echo in my office on 16 with normal LV size and systolic function and LVEF=60-65% with trace AI and mild (1+) MR and TR but no evidence for ischemia at that time. CHIEF COMPLAINT:  Patient is a 48y old  Female who presents with a chief complaint of palpitations (2018 23:52)      HPI: 18:  48 y.o. female well known to me with PMH SVT's with A-V armand ablation in  at [facility], hx endometriosis s/p L ovary removal for chocolate cyst at age 41, presents with palpitations.  She is physical  at the [facility] in [place] and was throwing the ball when the palpitation started.  She reports they are usually self resolves, but this time, it continued even while in the ED.  She reports feeling lightheadedness during this time. She has been getting more of these, usually lasting 20-30 minutes and resolves with either splashing water in her face and/or resting.  She denies fever, chills, sore throat, abd pain, dysuria or diarrhea.  She reports associated SOB and chest tightness with this current event. Currently, denies symptoms.  She drinks 2 cups coffee daily.  She had a normal stress echo in my office on 16 with normal LV size and systolic function and LVEF=60-65% with trace AI and mild (1+) MR and TR but no evidence for ischemia at that time.  She is now asymptomatic.  She does have Lopressor at home to use prn but has not been using it lately.    18:  No recurrent SVT.  NSR on the monitor.  No anginal chest pains.  Slight bump in Troponin levels coming down, related to demand ischemia.  Unremarkable echo with normal LV systolic function and insignificant valvular findings as noted below.  TFT's normal and Lyme titer (-).  For SVT ablation this afternoon.  No new cardiac symptoms or new cardiac issues.    PMH: as above  PSH: as above  Social Hx: denies tobacco, 4-5 drinks  Family Hx: Father-?aortic dissection; Mother-MI, TIA, RA  ROS: per HPI (2018 23:52)        PMHx:  PAST MEDICAL & SURGICAL HISTORY:  SVT s/p AV armand ablation at [facility]-  endometrioma removal-GYN Dr ORION Lisa      ALLERGIES:  Allergies  No Known Allergies      REVIEW OF SYSTEMS:    CONSTITUTIONAL: No fevers or chills  EYES/ENT: No visual changes;  No vertigo or throat pain   NECK: No pain or stiffness  RESPIRATORY: No cough, wheezing, hemoptysis; No shortness of breath  GASTROINTESTINAL: No abdominal or epigastric pain. No nausea, vomiting, or hematemesis; No diarrhea or constipation. No melena or hematochezia.  GENITOURINARY: No dysuria, frequency or hematuria  NEUROLOGICAL: No numbness  SKIN: No itching, burning, rashes, or lesions   All other review of systems is negative unless indicated above    Vital Signs Last 24 Hrs  T(C): 36.7 (2018 04:53), Max: 37.1 (2018 10:27)  T(F): 98.1 (2018 04:53), Max: 98.7 (2018 10:27)  HR: 67 (2018 04:53) (67 - 84)  BP: 100/74 (2018 04:53) (100/74 - 118/62)  BP(mean): --  RR: 18 (2018 04:53) (18 - 18)  SpO2: 95% (2018 04:53) (94% - 95%)      PHYSICAL EXAM:   Constitutional: NAD, awake and alert, well-developed  HEENT: PERR, EOMI, Normal Hearing, MMM  Neck: Soft and supple, No LAD, No JVD  Respiratory: Breath sounds are clear bilaterally, No wheezing, rales or rhonchi  Cardiovascular: S1 and S2, regular rate and rhythm, soft LONG at LLSB and base as before, no gallops or rubs  Gastrointestinal: Bowel Sounds present, soft, nontender, nondistended, no guarding, no rebound  Extremities: No peripheral edema  Vascular: 2+ peripheral pulses  Neurological: A/O x 3, no focal deficits  Musculoskeletal: 5/5 strength b/l upper and lower extremities  Skin: No rashes      MEDICATIONS  (STANDING):  aspirin  chewable 81 milliGRAM(s) Oral daily  sodium chloride 0.9%. 1000 milliLiter(s) (100 mL/Hr) IV Continuous <Continuous>      LABS: All Labs Reviewed:                        12.2   564  )-----------( 207      ( 2018 05:37 )             37.2     04-26    143  |  111<H>  |  10  ----------------------------<  99  4.1   |  24  |  0.89    Ca    9.1      2018 05:37  Mg     2.0         TPro  8.7<H>  /  Alb  4.2  /  TBili  0.4  /  DBili  x   /  AST  46<H>  /  ALT  49  /  AlkPhos  87      PT/INR - ( 2018 16:38 )   PT: 11.0 sec;   INR: 1.02 ratio         PTT - ( 2018 16:38 )  PTT:33.8 sec  CARDIAC MARKERS ( 2018 06:03 )  0.234 ng/mL / x     / x     / x     / x      CARDIAC MARKERS ( 2018 23:56 )  0.385 ng/mL / x     / x     / x     / x      CARDIAC MARKERS ( 2018 19:54 )  0.296 ng/mL / x     / x     / x     / x      CARDIAC MARKERS ( 2018 16:38 )  <0.015 ng/mL / x     / 104 U/L / x     / x        Borrelia burgdorferi IgG/IgM Antibodies (18 @ 15:48)    LYME IgG/IgM Antibodies Result: 0.21 Index    Lyme C6 Interpretation: Negative: METHOD: MANJEET      Reference Range: (values expressed as Lyme Index )                                < 0.90        Negative                                0.90 - 1.09   Equivocal                                >= 1.10        Positive      BLOOD CULTURES:     LIPID PROFILE lipid profile                           @ 06:03  cholesterol           192 mg/dL  Direct LDL            101 mg/dL  HDL cholesterol       80 mg/dL  HDL/Total cholesterol --  Triglycerides, serum  53 mg/dL      RADIOLOGY:    CXR: 18:  Normal AP chest.     EK18 @ 17:09:15 (after IV Adenosine)  NSR, WNL    18 @16:36;18  SVT with NSSTTwave changes    TELEMETRY:  SVT=>NSR    ECHO:    TTE/HUNT. HOSP: 18:  M-Mode Measurements (cm)     LVEDd: 5.47 cm            LVESd: 3.52 cm   IVSEd: 0.87 cm   LVPWd: 1.04 cm            AO Root Dimension: 3.5 cm                             ACS: 2.3 cm                             LA: 3.4 cm    Doppler Measurements:                                    MV Peak E-Wave: 82.9 cm/s   TR Velocity:236 cm/s           MV Peak A-Wave: 65.2 cm/s   TR Gradient:22.2784 mmHg       MV E/A Ratio: 1.27 %   Estimated RAP:10 mmHg          MV Peak Gradient: 2.75 mmHg   RVSP:32 mmHg    FINDINGS:   Mitral Valve   Normal appearing mitral valve structure and function.   Trace mitral regurgitation is present.     Aortic Valve   Normal aortic valve structure and function.     Tricuspid Valve   Normal appearing tricuspid valve structure and function.   Trace tricuspid valve regurgitation is present.     Pulmonic Valve   Normal appearing pulmonic valve structure and function.     Left Atrium   Normal appearing left atrium.     Left Ventricle   The left ventricle is normal in size, wall thickness, wall motion and contractility.   Estimated left ventricular ejection fraction is 60-65 %.     Right Atrium   Normal appearing right atrium.     Right Ventricle   Normal appearing right ventricle structure and function.     Pericardial Effusion   No evidence of pericardial effusion.     Pleural Effusion   No evidence of pleural effusion.     Miscellaneous   All visualized extra cardiac structures appears to be normal.     IMPRESSION:   Normal appearing mitral valve structure and function.   Trace mitral regurgitation is present.   Normal appearing tricuspid valve structure and function.   Trace tricuspid valve regurgitation is present.   The left ventricle is normal in size, wall thickness, wall motion and contractility.   Estimated left ventricular ejection fraction is 60-65 %.      STRESS ECHO/TTE: 16:  Normal stress echo in my office on 16 with normal LV size and systolic function and LVEF=60-65% with trace AI and mild (1+) MR and TR and no evidence for ischemia at that time.

## 2018-04-27 ENCOUNTER — TRANSCRIPTION ENCOUNTER (OUTPATIENT)
Age: 48
End: 2018-04-27

## 2018-04-27 VITALS — DIASTOLIC BLOOD PRESSURE: 71 MMHG | SYSTOLIC BLOOD PRESSURE: 123 MMHG | HEART RATE: 98 BPM

## 2018-04-27 LAB
ANION GAP SERPL CALC-SCNC: 6 MMOL/L — SIGNIFICANT CHANGE UP (ref 5–17)
BUN SERPL-MCNC: 13 MG/DL — SIGNIFICANT CHANGE UP (ref 7–23)
CALCIUM SERPL-MCNC: 9 MG/DL — SIGNIFICANT CHANGE UP (ref 8.5–10.1)
CHLORIDE SERPL-SCNC: 108 MMOL/L — SIGNIFICANT CHANGE UP (ref 96–108)
CO2 SERPL-SCNC: 26 MMOL/L — SIGNIFICANT CHANGE UP (ref 22–31)
CREAT SERPL-MCNC: 0.96 MG/DL — SIGNIFICANT CHANGE UP (ref 0.5–1.3)
GLUCOSE SERPL-MCNC: 103 MG/DL — HIGH (ref 70–99)
HCT VFR BLD CALC: 37.1 % — SIGNIFICANT CHANGE UP (ref 34.5–45)
HGB BLD-MCNC: 12.3 G/DL — SIGNIFICANT CHANGE UP (ref 11.5–15.5)
MCHC RBC-ENTMCNC: 30 PG — SIGNIFICANT CHANGE UP (ref 27–34)
MCHC RBC-ENTMCNC: 33.2 GM/DL — SIGNIFICANT CHANGE UP (ref 32–36)
MCV RBC AUTO: 90.5 FL — SIGNIFICANT CHANGE UP (ref 80–100)
NRBC # BLD: 0 /100 WBCS — SIGNIFICANT CHANGE UP (ref 0–0)
PLATELET # BLD AUTO: 199 K/UL — SIGNIFICANT CHANGE UP (ref 150–400)
POTASSIUM SERPL-MCNC: 3.9 MMOL/L — SIGNIFICANT CHANGE UP (ref 3.5–5.3)
POTASSIUM SERPL-SCNC: 3.9 MMOL/L — SIGNIFICANT CHANGE UP (ref 3.5–5.3)
RBC # BLD: 4.1 M/UL — SIGNIFICANT CHANGE UP (ref 3.8–5.2)
RBC # FLD: 12.6 % — SIGNIFICANT CHANGE UP (ref 10.3–14.5)
SODIUM SERPL-SCNC: 140 MMOL/L — SIGNIFICANT CHANGE UP (ref 135–145)
WBC # BLD: 5.47 K/UL — SIGNIFICANT CHANGE UP (ref 3.8–10.5)
WBC # FLD AUTO: 5.47 K/UL — SIGNIFICANT CHANGE UP (ref 3.8–10.5)

## 2018-04-27 PROCEDURE — 93010 ELECTROCARDIOGRAM REPORT: CPT

## 2018-04-27 PROCEDURE — 99233 SBSQ HOSP IP/OBS HIGH 50: CPT

## 2018-04-27 RX ORDER — ASPIRIN/CALCIUM CARB/MAGNESIUM 324 MG
1 TABLET ORAL
Qty: 0 | Refills: 0 | COMMUNITY

## 2018-04-27 RX ORDER — ASPIRIN/CALCIUM CARB/MAGNESIUM 324 MG
1 TABLET ORAL
Qty: 0 | Refills: 0 | COMMUNITY
Start: 2018-04-27

## 2018-04-27 RX ADMIN — Medication 81 MILLIGRAM(S): at 12:34

## 2018-04-27 RX ADMIN — Medication 100 MILLIGRAM(S): at 05:42

## 2018-04-27 NOTE — DISCHARGE NOTE ADULT - MEDICATION SUMMARY - MEDICATIONS TO TAKE
I will START or STAY ON the medications listed below when I get home from the hospital:    aspirin 81 mg oral tablet, chewable  -- 1 tab(s) by mouth once a day  -- Indication: For Atrial fibrillation    Lopressor  -- only during palpitation  -- Indication: For SVT (supraventricular tachycardia)

## 2018-04-27 NOTE — PROGRESS NOTE ADULT - SUBJECTIVE AND OBJECTIVE BOX
CHIEF COMPLAINT:  Patient is a 48y old  Female who presents with a chief complaint of palpitations (2018 23:52)      HPI: 18:  48 y.o. female well known to me with PMH SVT's with A-V armand ablation in  at [facility], hx endometriosis s/p L ovary removal for chocolate cyst at age 41, presents with palpitations.  She is physical  at the [facility] in [place] and was throwing the ball when the palpitation started.  She reports they are usually self resolves, but this time, it continued even while in the ED.  She reports feeling lightheadedness during this time. She has been getting more of these, usually lasting 20-30 minutes and resolves with either splashing water in her face and/or resting.  She denies fever, chills, sore throat, abd pain, dysuria or diarrhea.  She reports associated SOB and chest tightness with this current event. Currently, denies symptoms.  She drinks 2 cups coffee daily.  She had a normal stress echo in my office on 16 with normal LV size and systolic function and LVEF=60-65% with trace AI and mild (1+) MR and TR but no evidence for ischemia at that time.  She is now asymptomatic.  She does have Lopressor at home to use prn but has not been using it lately.    18:  No recurrent SVT.  NSR on the monitor.  No anginal chest pains.  Slight bump in Troponin levels coming down, related to demand ischemia.  Unremarkable echo with normal LV systolic function and insignificant valvular findings as noted below.  TFT's normal and Lyme titer (-).  For SVT ablation this afternoon.  No new cardiac symptoms or new cardiac issues.    18:  Tolerated SVT ablation without recurrent arrhythmias.  No anginal chest pains or SOB.  No cardiac issues overnight.    PMH: as above  PSH: as above  Social Hx: denies tobacco, 4-5 drinks  Family Hx: Father-?aortic dissection; Mother-MI, TIA, RA  ROS: per HPI (2018 23:52)        PMHx:  PAST MEDICAL & SURGICAL HISTORY:  SVT s/p AV armand ablation at [facility]-  endometrioma removal-GYN Dr ORION Lisa      ALLERGIES:  Allergies  No Known Allergies      REVIEW OF SYSTEMS:    CONSTITUTIONAL: No fevers or chills  EYES/ENT: No visual changes;  No vertigo or throat pain   NECK: No pain or stiffness  RESPIRATORY: No cough, wheezing, hemoptysis; No shortness of breath  GASTROINTESTINAL: No abdominal or epigastric pain. No nausea, vomiting, or hematemesis; No diarrhea or constipation. No melena or hematochezia.  GENITOURINARY: No dysuria, frequency or hematuria  NEUROLOGICAL: No numbness  SKIN: No itching, burning, rashes, or lesions   All other review of systems is negative unless indicated above    Vital Signs Last 24 Hrs  T(C): 36.5 (2018 05:17), Max: 36.9 (2018 14:24)  T(F): 97.7 (2018 05:17), Max: 98.5 (2018 14:24)  HR: 74 (2018 06:00) (62 - 96)  BP: 114/74 (2018 06:00) (89/55 - 125/83)  BP(mean): 82 (2018 06:00) (62 - 86)  RR: 13 (2018 21:00) (13 - 26)  SpO2: 95% (2018 17:55) (95% - 98%)    I&O's Summary    2018 07:01  -  2018 06:47  --------------------------------------------------------  IN: 0 mL / OUT: 200 mL / NET: -200 mL        PHYSICAL EXAM:   Constitutional: NAD, awake and alert, well-developed  HEENT: PERR, EOMI, Normal Hearing, MMM  Neck: Soft and supple, No LAD, No JVD  Respiratory: Breath sounds are clear bilaterally, No wheezing, rales or rhonchi  Cardiovascular: S1 and S2, regular rate and rhythm, soft LONG at LLSB and base as before, no gallops or rubs  Gastrointestinal: Bowel Sounds present, soft, nontender, nondistended, no guarding, no rebound  Extremities: No peripheral edema, groin sites clean bilaterally, good distal pulses  Vascular: 2+ peripheral pulses  Neurological: A/O x 3, no focal deficits  Musculoskeletal: 5/5 strength b/l upper and lower extremities  Skin: No rashes    MEDICATIONS  (STANDING):  aspirin  chewable 81 milliGRAM(s) Oral daily  docusate sodium 100 milliGRAM(s) Oral two times a day      LABS: All Labs Reviewed:                        12.3   5.47  )-----------( 199      ( 2018 05:32 )             37.1     04-27    140  |  108  |  13  ----------------------------<  103<H>  3.9   |  26  |  0.96    Ca    9.0      2018 05:32        PTT - ( 2018 16:38 )  PTT:33.8 sec  CARDIAC MARKERS ( 2018 06:03 )  0.234 ng/mL / x     / x     / x     / x      CARDIAC MARKERS ( 2018 23:56 )  0.385 ng/mL / x     / x     / x     / x      CARDIAC MARKERS ( 2018 19:54 )  0.296 ng/mL / x     / x     / x     / x      CARDIAC MARKERS ( 2018 16:38 )  <0.015 ng/mL / x     / 104 U/L / x     / x        Borrelia burgdorferi IgG/IgM Antibodies (18 @ 15:48)    LYME IgG/IgM Antibodies Result: 0.21 Index    Lyme C6 Interpretation: Negative: METHOD: MANJEET      Reference Range: (values expressed as Lyme Index )                                < 0.90        Negative                                0.90 - 1.09   Equivocal                                >= 1.10        Positive      BLOOD CULTURES:     LIPID PROFILE lipid profile                           @ 06:03  cholesterol           192 mg/dL  Direct LDL            101 mg/dL  HDL cholesterol       80 mg/dL  HDL/Total cholesterol --  Triglycerides, serum  53 mg/dL      RADIOLOGY:    CXR: 18:  Normal AP chest.     EK18 @ 17:09:15 (after IV Adenosine)  NSR, WNL    18 @16:36;18  SVT with NSSTTwave changes    TELEMETRY:  SVT=>NSR    ECHO:    TTE/HUNT. HOSP: 18:  M-Mode Measurements (cm)     LVEDd: 5.47 cm            LVESd: 3.52 cm   IVSEd: 0.87 cm   LVPWd: 1.04 cm            AO Root Dimension: 3.5 cm                             ACS: 2.3 cm                             LA: 3.4 cm    Doppler Measurements:                                    MV Peak E-Wave: 82.9 cm/s   TR Velocity:236 cm/s           MV Peak A-Wave: 65.2 cm/s   TR Gradient:22.2784 mmHg       MV E/A Ratio: 1.27 %   Estimated RAP:10 mmHg          MV Peak Gradient: 2.75 mmHg   RVSP:32 mmHg    FINDINGS:   Mitral Valve   Normal appearing mitral valve structure and function.   Trace mitral regurgitation is present.     Aortic Valve   Normal aortic valve structure and function.     Tricuspid Valve   Normal appearing tricuspid valve structure and function.   Trace tricuspid valve regurgitation is present.     Pulmonic Valve   Normal appearing pulmonic valve structure and function.     Left Atrium   Normal appearing left atrium.     Left Ventricle   The left ventricle is normal in size, wall thickness, wall motion and contractility.   Estimated left ventricular ejection fraction is 60-65 %.     Right Atrium   Normal appearing right atrium.     Right Ventricle   Normal appearing right ventricle structure and function.     Pericardial Effusion   No evidence of pericardial effusion.     Pleural Effusion   No evidence of pleural effusion.     Miscellaneous   All visualized extra cardiac structures appears to be normal.     IMPRESSION:   Normal appearing mitral valve structure and function.   Trace mitral regurgitation is present.   Normal appearing tricuspid valve structure and function.   Trace tricuspid valve regurgitation is present.   The left ventricle is normal in size, wall thickness, wall motion and contractility.   Estimated left ventricular ejection fraction is 60-65 %.      STRESS ECHO/TTE: 16:  Normal stress echo in my office on 16 with normal LV size and systolic function and LVEF=60-65% with trace AI and mild (1+) MR and TR and no evidence for ischemia at that time.

## 2018-04-27 NOTE — DISCHARGE NOTE ADULT - OTHER SIGNIFICANT FINDINGS
Physical Exam on day of discharge 4/27/18    Vital Signs Last 24 Hrs  T(C): 36.5 (27 Apr 2018 05:17), Max: 36.9 (26 Apr 2018 14:24)  T(F): 97.7 (27 Apr 2018 05:17), Max: 98.5 (26 Apr 2018 14:24)  HR: 98 (27 Apr 2018 08:00) (63 - 98)  BP: 123/71 (27 Apr 2018 08:00) (89/55 - 127/75)  BP(mean): 84 (27 Apr 2018 08:00) (62 - 88)  RR: 13 (26 Apr 2018 21:00) (13 - 26)  SpO2: 95% (26 Apr 2018 17:55) (95% - 98%)    Constitutional: NAD, awake and alert, well-developed  HEENT: PERR, EOMI, Normal Hearing, MMM  Neck: Soft and supple, No LAD, No JVD  Respiratory: Breath sounds are clear bilaterally, No wheezing, rales or rhonchi  Cardiovascular: S1 and S2, regular rate and rhythm, no Murmurs, gallops or rubs  Gastrointestinal: Bowel Sounds present, soft, nontender, nondistended, no guarding, no rebound  Extremities: No peripheral edema  Vascular: 2+ peripheral pulses  Neurological: A/O x 3, no focal deficits  Musculoskeletal: 5/5 strength b/l upper and lower extremities  Skin: No rashes

## 2018-04-27 NOTE — DISCHARGE NOTE ADULT - ADDITIONAL INSTRUCTIONS
Follow up with the following doctors approximately two weeks after discharge for continuity of care and medical management:  Dr. Rory Mireles Cardiology  Dr. Vishnu Winkler Electrophysiology  Dr. Sue Wells Primary Care Physician

## 2018-04-27 NOTE — PROGRESS NOTE ADULT - ASSESSMENT
48y  Female with a PMH of AVNRT catheter ablation in 1990 presenting with recurrent tachycardia s/p  EPS/ SVT Ablation with Dr Vishnu Winkler POD #1   Assessment as above  Stable for discharge

## 2018-04-27 NOTE — PROGRESS NOTE ADULT - PROBLEM SELECTOR PLAN 1
S/P Catheter ablation  -Discharge home  -F/U in 2 weeks with Dr Winkler  -Activity and work instructions provided

## 2018-04-27 NOTE — DISCHARGE NOTE ADULT - CARE PROVIDERS DIRECT ADDRESSES
,DirectAddress_Unknown,[email or web address]

## 2018-04-27 NOTE — PROGRESS NOTE ADULT - SUBJECTIVE AND OBJECTIVE BOX
Subjective:  48y  Female with a PMH of AVNRT catheter ablation in 1990 presenting with recurrent tachycardia s/p  EPS/ SVT Ablation with Dr Vishnu Winkler POD #1     PAST MEDICAL & SURGICAL HISTORY:  As above    No Known Allergies    MEDICATIONS  (STANDING):  aspirin  chewable 81 milliGRAM(s) Oral daily  docusate sodium 100 milliGRAM(s) Oral two times a day    MEDICATIONS  (PRN):  acetaminophen   Tablet 650 milliGRAM(s) Oral every 6 hours PRN For Temp greater than 38 C (100.4 F), mild pain, HA      General: No fatigue, no fevers/chills-Denies any c/o CP, SOB, or palpitations  Respiratory: No dyspnea, no cough, no wheeze  CV: No chest pain, no palpitations  Abd: No nausea  Neuro: No headache, no dizziness  Extremities: Bilateral groin sites soft-nontender-tegaderm drsg. removed-no bleeding or hematoma. MILIAN Bilateral pedal pulses palpable.      Objective:  Vital Signs Last 24 Hrs  T(C): 36.5 (27 Apr 2018 05:17), Max: 36.9 (26 Apr 2018 14:24)  T(F): 97.7 (27 Apr 2018 05:17), Max: 98.5 (26 Apr 2018 14:24)  HR: 74 (27 Apr 2018 06:00) (62 - 96)  BP: 114/74 (27 Apr 2018 06:00) (89/55 - 125/83)  BP(mean): 82 (27 Apr 2018 06:00) (62 - 86)  RR: 13 (26 Apr 2018 21:00) (13 - 26)  SpO2: 95% (26 Apr 2018 17:55) (95% - 98%)  NEURO: A & O x 3, no focal neurologic deficits  PULM:  CTA B/L No W/R/R  CARD: RRR, +S1, +S2, No M/R/G  ABD: ND, +BS, NT, no masses  EXT: upper - Warm, pedal edema yes/no, pitting/non-pitting;  lower - Warm, pedal edema yes/no, pitting/non-pitting  PULSES:     FEM: DP    2+                            Labs:                        12.3   5.47  )-----------( 199      ( 27 Apr 2018 05:32 )             37.1     04-27    140  |  108  |  13  ----------------------------<  103<H>  3.9   |  26  |  0.96    Ca    9.0      27 Apr 2018 05:32          EKG: NSR HR 71 VA-182 ms    Tele- NSR-Avg HR 70-90 throughout night-no lacey or HB detected.    A/P:  S/P EPS with SVT Ablation POD #1-Stable for discharge  - -  Continue prior home medication-ASA 81 mg po daily  -  - Follow up with Dr. Winkler in 2 weeks (appt given)        Discharge instructions related to acitvity, work, and hydration provided-pt verbalizes understanding.

## 2018-04-27 NOTE — PROGRESS NOTE ADULT - ASSESSMENT
4/25/18:  Pt with above history and recurrent SVT using Lopressor prn at home but not using it lately despite more frequent but short lived SVT's.  Mild bump in Troponin most likely due to demand ischemia and not ACS.  Will repeat an echo and have EP see the pt for possible repeat SVT ablation.  Continue to avoid caffeine and will check TFT's and lyme titer (doubt).  Continue as outlined by medicine etal.  Will follow.    4/26/18:  No recurrent SVT.  NSR on the monitor.  No anginal chest pains.  Slight bump in Troponin levels coming down, related to demand ischemia.  Unremarkable echo with normal LV systolic function and insignificant valvular findings as noted below.  TFT's normal and Lyme titer (-).  For SVT ablation this afternoon.  No new cardiac symptoms or new cardiac issues.  Continue as per EP service and medicine etal.  Will follow.    4/27/18:  Tolerated SVT ablation without recurrent arrhythmias.  No anginal chest pains or SOB.  No cardiac issues overnight.  Home today and follow up with Dr Winkler as per EP and with me in 1-2 weeks or prn.  Continue her current meds for now.

## 2018-04-27 NOTE — DISCHARGE NOTE ADULT - PLAN OF CARE
Control Heart Rate At present your heart rate is stable. Be sure that if you have episodes of Palpitations, to take Lopressor.  -Follow up with Cardiologist and Electrophysiologist Maintain normal heart rhythm Not experiencing Atrial fibrillation on this admission. Be sure to take Aspirin daily as a preventive anticoagulant measure. Follow up with Primary Care Physician for continuity of care.

## 2018-04-27 NOTE — DISCHARGE NOTE ADULT - CARE PROVIDER_API CALL
Rory Mireles (MD), Cardiovascular Disease; Internal Medicine  [address]  [address]  [place] [address]  Phone: [phone]  Fax: [phone]    Vishnu Winkler (MD), Cardiac Electrophysiology; Cardiovascular Disease  [address] [place] [address]  Phone: [phone]  Fax: [phone]    Sue Wells), Internal Medicine  [address] [place] [address]  Phone: [phone]  Fax: [phone]

## 2018-04-27 NOTE — DISCHARGE NOTE ADULT - PATIENT PORTAL LINK FT
You can access the [facility] Patient Portal, offered by [facility], by registering with the following website: [email or web address]

## 2018-04-27 NOTE — DISCHARGE NOTE ADULT - FINDINGS/TREATMENT
Well Tolerated Normal appearing mitral valve structure and function.   Trace mitral regurgitation is present.   Normal appearing tricuspid valve structure and function.   Trace tricuspid valve regurgitation is present.   The left ventricle is normal in size, wall thickness, wall motion and   contractility.   Estimated left ventricular ejection fraction is 60-65 %.

## 2018-04-27 NOTE — DISCHARGE NOTE ADULT - CARE PLAN
Principal Discharge DX:	SVT (supraventricular tachycardia)  Goal:	Control Heart Rate  Assessment and plan of treatment:	At present your heart rate is stable. Be sure that if you have episodes of Palpitations, to take Lopressor.  -Follow up with Cardiologist and Electrophysiologist  Secondary Diagnosis:	Atrial fibrillation  Goal:	Maintain normal heart rhythm  Assessment and plan of treatment:	Not experiencing Atrial fibrillation on this admission. Be sure to take Aspirin daily as a preventive anticoagulant measure. Follow up with Primary Care Physician for continuity of care.

## 2018-05-06 DIAGNOSIS — I24.8 OTHER FORMS OF ACUTE ISCHEMIC HEART DISEASE: ICD-10-CM

## 2018-05-06 DIAGNOSIS — Z79.82 LONG TERM (CURRENT) USE OF ASPIRIN: ICD-10-CM

## 2018-05-06 DIAGNOSIS — I47.1 SUPRAVENTRICULAR TACHYCARDIA: ICD-10-CM

## 2018-05-06 DIAGNOSIS — N17.9 ACUTE KIDNEY FAILURE, UNSPECIFIED: ICD-10-CM

## 2018-05-06 DIAGNOSIS — I48.91 UNSPECIFIED ATRIAL FIBRILLATION: ICD-10-CM

## 2018-05-06 DIAGNOSIS — I34.0 NONRHEUMATIC MITRAL (VALVE) INSUFFICIENCY: ICD-10-CM

## 2018-05-25 ENCOUNTER — APPOINTMENT (OUTPATIENT)
Dept: ELECTROPHYSIOLOGY | Facility: CLINIC | Age: 48
End: 2018-05-25
Payer: COMMERCIAL

## 2018-05-25 ENCOUNTER — NON-APPOINTMENT (OUTPATIENT)
Age: 48
End: 2018-05-25

## 2018-05-25 VITALS
DIASTOLIC BLOOD PRESSURE: 89 MMHG | BODY MASS INDEX: 36.43 KG/M2 | WEIGHT: 246 LBS | HEIGHT: 69 IN | HEART RATE: 68 BPM | SYSTOLIC BLOOD PRESSURE: 140 MMHG

## 2018-05-25 DIAGNOSIS — Z82.49 FAMILY HISTORY OF ISCHEMIC HEART DISEASE AND OTHER DISEASES OF THE CIRCULATORY SYSTEM: ICD-10-CM

## 2018-05-25 DIAGNOSIS — Z78.9 OTHER SPECIFIED HEALTH STATUS: ICD-10-CM

## 2018-05-25 DIAGNOSIS — Z60.2 PROBLEMS RELATED TO LIVING ALONE: ICD-10-CM

## 2018-05-25 PROBLEM — I48.91 UNSPECIFIED ATRIAL FIBRILLATION: Chronic | Status: ACTIVE | Noted: 2018-04-24

## 2018-05-25 PROCEDURE — 99214 OFFICE O/P EST MOD 30 MIN: CPT

## 2018-05-25 PROCEDURE — 93000 ELECTROCARDIOGRAM COMPLETE: CPT

## 2018-05-25 RX ORDER — METOPROLOL TARTRATE 50 MG/1
TABLET ORAL
Refills: 0 | Status: COMPLETED | COMMUNITY

## 2018-05-25 SDOH — SOCIAL STABILITY - SOCIAL INSECURITY: PROBLEMS RELATED TO LIVING ALONE: Z60.2

## 2018-05-31 ENCOUNTER — APPOINTMENT (OUTPATIENT)
Dept: ELECTROPHYSIOLOGY | Facility: CLINIC | Age: 48
End: 2018-05-31
Payer: COMMERCIAL

## 2018-05-31 PROCEDURE — 93224 XTRNL ECG REC UP TO 48 HRS: CPT

## 2019-05-26 ENCOUNTER — TRANSCRIPTION ENCOUNTER (OUTPATIENT)
Age: 49
End: 2019-05-26

## 2019-08-19 ENCOUNTER — APPOINTMENT (OUTPATIENT)
Dept: OBGYN | Facility: CLINIC | Age: 49
End: 2019-08-19
Payer: COMMERCIAL

## 2019-08-19 VITALS
BODY MASS INDEX: 38.51 KG/M2 | DIASTOLIC BLOOD PRESSURE: 78 MMHG | SYSTOLIC BLOOD PRESSURE: 115 MMHG | HEIGHT: 69 IN | WEIGHT: 260 LBS

## 2019-08-19 DIAGNOSIS — Z80.49 FAMILY HISTORY OF MALIGNANT NEOPLASM OF OTHER GENITAL ORGANS: ICD-10-CM

## 2019-08-19 PROCEDURE — 99396 PREV VISIT EST AGE 40-64: CPT

## 2019-08-19 NOTE — PHYSICAL EXAM
[Awake] : awake [Alert] : alert [LAD] : no lymphadenopathy [Acute Distress] : no acute distress [Goiter] : no goiter [Thyroid Nodule] : no thyroid nodule [Mass] : no breast mass [Nipple Discharge] : no nipple discharge [Soft] : soft [Axillary LAD] : no axillary lymphadenopathy [H/Smegaly] : no hepatosplenomegaly [Distended] : not distended [Tender] : non tender [Oriented x3] : oriented to person, place, and time [Depressed Mood] : not depressed [Flat Affect] : affect not flat [Normal] : cervix [No Bleeding] : there was no active vaginal bleeding [Uterine Adnexae] : were not tender and not enlarged

## 2019-08-19 NOTE — CHIEF COMPLAINT
[Annual Visit] : annual visit [FreeTextEntry1] : 50 yo G0 with LMP 12/2017 presents for annual with 5 days of bleeding 8/10/19.

## 2019-08-19 NOTE — HISTORY OF PRESENT ILLNESS
[Last Mammogram ___] : Last Mammogram was [unfilled] [Last Colonoscopy ___] : Last colonoscopy [unfilled] [Last Pap ___] : Last cervical pap smear was [unfilled] [Perimenopausal] : is perimenopausal

## 2019-08-21 LAB — HPV HIGH+LOW RISK DNA PNL CVX: NOT DETECTED

## 2019-08-26 ENCOUNTER — FORM ENCOUNTER (OUTPATIENT)
Age: 49
End: 2019-08-26

## 2019-08-27 ENCOUNTER — APPOINTMENT (OUTPATIENT)
Dept: ULTRASOUND IMAGING | Facility: CLINIC | Age: 49
End: 2019-08-27
Payer: COMMERCIAL

## 2019-08-27 ENCOUNTER — OUTPATIENT (OUTPATIENT)
Dept: OUTPATIENT SERVICES | Facility: HOSPITAL | Age: 49
LOS: 1 days | End: 2019-08-27
Payer: COMMERCIAL

## 2019-08-27 DIAGNOSIS — N93.9 ABNORMAL UTERINE AND VAGINAL BLEEDING, UNSPECIFIED: ICD-10-CM

## 2019-08-27 DIAGNOSIS — Z00.8 ENCOUNTER FOR OTHER GENERAL EXAMINATION: ICD-10-CM

## 2019-08-27 PROCEDURE — 76830 TRANSVAGINAL US NON-OB: CPT | Mod: 26

## 2019-08-27 PROCEDURE — 76830 TRANSVAGINAL US NON-OB: CPT

## 2019-08-27 PROCEDURE — 76856 US EXAM PELVIC COMPLETE: CPT

## 2019-08-27 PROCEDURE — 76856 US EXAM PELVIC COMPLETE: CPT | Mod: 26

## 2019-08-29 ENCOUNTER — APPOINTMENT (OUTPATIENT)
Dept: OBGYN | Facility: CLINIC | Age: 49
End: 2019-08-29
Payer: COMMERCIAL

## 2019-08-29 VITALS
BODY MASS INDEX: 38.51 KG/M2 | SYSTOLIC BLOOD PRESSURE: 135 MMHG | HEIGHT: 69 IN | WEIGHT: 260 LBS | DIASTOLIC BLOOD PRESSURE: 88 MMHG

## 2019-08-29 DIAGNOSIS — N95.1 MENOPAUSAL AND FEMALE CLIMACTERIC STATES: ICD-10-CM

## 2019-08-29 DIAGNOSIS — N93.9 ABNORMAL UTERINE AND VAGINAL BLEEDING, UNSPECIFIED: ICD-10-CM

## 2019-08-29 LAB
HCG UR QL: NEGATIVE
QUALITY CONTROL: YES

## 2019-08-29 PROCEDURE — 99213 OFFICE O/P EST LOW 20 MIN: CPT

## 2019-08-29 NOTE — PROCEDURE
[Endometrial Biopsy] : Endometrial biopsy [Risks] : risks [Post-Menop. Bleeding] : post-menopausal bleeding [Benefits] : benefits [Alternatives] : alternatives [Infection] : infection [Bleeding] : bleeding [Allergic Reaction] : allergic reaction [Uterine Perforation] : uterine perforation [Pain] : pain [LMP ___] : LMP was [unfilled] [Ibuprofen ___ mg] : ibuprofen [unfilled] ~Umg [Neg Pregnancy Test] : a pregnancy test was negative [None] : none [Tenaculum] : a single toothed tenaculum [Required Dilation] : required dilation [de-identified] : complete cervical stenosis. Pt tolerated attempted dilation very well. Did not stop due to pain, but despite uterine sound, uterine dilators, disposable and metal, unable to open cervix. [de-identified] : Procedure stopped due to inability to dilate cervix.

## 2019-08-29 NOTE — CHIEF COMPLAINT
[FreeTextEntry1] : 48 yo G0 with LMP 12/2017 before 8/10/19 presents for endometrial biopsy for PMB.

## 2019-09-19 NOTE — ED ADULT NURSE NOTE - NS ED NURSE RECORD ANOTHER HT AND WT
Spiritual Care Assessment/Progress Note  [address]      NAME: Clarice Clayton      MRN: [id number]  AGE: 65 y.o. SEX: female  [employer] Affiliation: Ruba   Language: English     9/19/2019     Total Time (in minutes): 5     Spiritual Assessment begun in [facility] 5W1 ORTHO SPINE through conversation with:         []Patient        [] Family    [] Friend(s)        Reason for Consult: Advance medical directive consult, Initial/Spiritual assessment, patient floor     Spiritual beliefs: (Please include comment if needed)     [] Identifies with a pam tradition:         [] Supported by a pam community:            [] Claims no spiritual orientation:           [] Seeking spiritual identity:                [] Adheres to an individual form of spirituality:           [x] Not able to assess:                           Identified resources for coping:      [] Prayer                               [] Music                  [] Guided Imagery     [] Family/friends                 [] Pet visits     [] Devotional reading                         [x] Unknown     [] Other:                                               Interventions offered during this visit: (See comments for more details)                Plan of Care:     [] Support spiritual and/or cultural needs    [] Support AMD and/or advance care planning process      [] Support grieving process   [] Coordinate Rites and/or Rituals    [] Coordination with community clergy   [] No spiritual needs identified at this time   [] Detailed Plan of Care below (See Comments)  [] Make referral to Music Therapy  [] Make referral to Pet Therapy     [] Make referral to Addiction services  [] Make referral to [facility]  [] Make referral to Spiritual Care Partner  [] No future visits requested        [] Follow up visits as needed     Comments:  visit for initial spiritual assessment and Advance Directive (AMD) consult.    made multiple visits to patient's room throughout the day, each time staff was with patient providing care. Patient resting at this time, did not respond to knock at door or verbal greeting. Will continue to follow up as needed and upon request as able. Visited by Rev. Chriss Saenz MDiv, [facility]: [phone] Yes

## 2019-11-19 ENCOUNTER — FORM ENCOUNTER (OUTPATIENT)
Age: 49
End: 2019-11-19

## 2019-11-20 ENCOUNTER — APPOINTMENT (OUTPATIENT)
Dept: MAMMOGRAPHY | Facility: CLINIC | Age: 49
End: 2019-11-20
Payer: COMMERCIAL

## 2019-11-20 ENCOUNTER — OUTPATIENT (OUTPATIENT)
Dept: OUTPATIENT SERVICES | Facility: HOSPITAL | Age: 49
LOS: 1 days | End: 2019-11-20
Payer: COMMERCIAL

## 2019-11-20 DIAGNOSIS — Z00.8 ENCOUNTER FOR OTHER GENERAL EXAMINATION: ICD-10-CM

## 2019-11-20 PROCEDURE — 77063 BREAST TOMOSYNTHESIS BI: CPT | Mod: 26

## 2019-11-20 PROCEDURE — 77067 SCR MAMMO BI INCL CAD: CPT | Mod: 26

## 2019-11-20 PROCEDURE — 77063 BREAST TOMOSYNTHESIS BI: CPT

## 2019-11-20 PROCEDURE — 77067 SCR MAMMO BI INCL CAD: CPT

## 2020-02-03 ENCOUNTER — EMERGENCY (EMERGENCY)
Facility: HOSPITAL | Age: 50
LOS: 0 days | Discharge: HOME | End: 2020-02-03
Attending: EMERGENCY MEDICINE
Payer: COMMERCIAL

## 2020-02-03 VITALS
SYSTOLIC BLOOD PRESSURE: 132 MMHG | HEART RATE: 72 BPM | RESPIRATION RATE: 15 BRPM | DIASTOLIC BLOOD PRESSURE: 67 MMHG | OXYGEN SATURATION: 99 %

## 2020-02-03 VITALS
RESPIRATION RATE: 17 BRPM | TEMPERATURE: 98 F | DIASTOLIC BLOOD PRESSURE: 92 MMHG | OXYGEN SATURATION: 96 % | HEIGHT: 69 IN | SYSTOLIC BLOOD PRESSURE: 148 MMHG | WEIGHT: 154.1 LBS | HEART RATE: 76 BPM

## 2020-02-03 DIAGNOSIS — R55 SYNCOPE AND COLLAPSE: ICD-10-CM

## 2020-02-03 DIAGNOSIS — R42 DIZZINESS AND GIDDINESS: ICD-10-CM

## 2020-02-03 DIAGNOSIS — R06.02 SHORTNESS OF BREATH: ICD-10-CM

## 2020-02-03 DIAGNOSIS — I48.91 UNSPECIFIED ATRIAL FIBRILLATION: ICD-10-CM

## 2020-02-03 DIAGNOSIS — Z79.82 LONG TERM (CURRENT) USE OF ASPIRIN: ICD-10-CM

## 2020-02-03 DIAGNOSIS — R20.0 ANESTHESIA OF SKIN: ICD-10-CM

## 2020-02-03 LAB
ALBUMIN SERPL ELPH-MCNC: 3.7 G/DL — SIGNIFICANT CHANGE UP (ref 3.3–5)
ALP SERPL-CCNC: 81 U/L — SIGNIFICANT CHANGE UP (ref 40–120)
ALT FLD-CCNC: 30 U/L — SIGNIFICANT CHANGE UP (ref 12–78)
ANION GAP SERPL CALC-SCNC: 7 MMOL/L — SIGNIFICANT CHANGE UP (ref 5–17)
APTT BLD: 31.7 SEC — SIGNIFICANT CHANGE UP (ref 27.5–36.3)
AST SERPL-CCNC: 21 U/L — SIGNIFICANT CHANGE UP (ref 15–37)
BASOPHILS # BLD AUTO: 0.03 K/UL — SIGNIFICANT CHANGE UP (ref 0–0.2)
BASOPHILS NFR BLD AUTO: 0.5 % — SIGNIFICANT CHANGE UP (ref 0–2)
BILIRUB SERPL-MCNC: 0.2 MG/DL — SIGNIFICANT CHANGE UP (ref 0.2–1.2)
BUN SERPL-MCNC: 14 MG/DL — SIGNIFICANT CHANGE UP (ref 7–23)
CALCIUM SERPL-MCNC: 10 MG/DL — SIGNIFICANT CHANGE UP (ref 8.5–10.1)
CHLORIDE SERPL-SCNC: 105 MMOL/L — SIGNIFICANT CHANGE UP (ref 96–108)
CO2 SERPL-SCNC: 23 MMOL/L — SIGNIFICANT CHANGE UP (ref 22–31)
CREAT SERPL-MCNC: 1.18 MG/DL — SIGNIFICANT CHANGE UP (ref 0.5–1.3)
D DIMER BLD IA.RAPID-MCNC: 167 NG/ML DDU — SIGNIFICANT CHANGE UP
EOSINOPHIL # BLD AUTO: 0.13 K/UL — SIGNIFICANT CHANGE UP (ref 0–0.5)
EOSINOPHIL NFR BLD AUTO: 2.2 % — SIGNIFICANT CHANGE UP (ref 0–6)
GLUCOSE SERPL-MCNC: 103 MG/DL — HIGH (ref 70–99)
HCG SERPL-ACNC: 2 MIU/ML — SIGNIFICANT CHANGE UP
HCT VFR BLD CALC: 39.6 % — SIGNIFICANT CHANGE UP (ref 34.5–45)
HGB BLD-MCNC: 13.3 G/DL — SIGNIFICANT CHANGE UP (ref 11.5–15.5)
IMM GRANULOCYTES NFR BLD AUTO: 0.3 % — SIGNIFICANT CHANGE UP (ref 0–1.5)
INR BLD: 1.04 RATIO — SIGNIFICANT CHANGE UP (ref 0.88–1.16)
LIDOCAIN IGE QN: 134 U/L — SIGNIFICANT CHANGE UP (ref 73–393)
LYMPHOCYTES # BLD AUTO: 1.74 K/UL — SIGNIFICANT CHANGE UP (ref 1–3.3)
LYMPHOCYTES # BLD AUTO: 29 % — SIGNIFICANT CHANGE UP (ref 13–44)
MAGNESIUM SERPL-MCNC: 1.9 MG/DL — SIGNIFICANT CHANGE UP (ref 1.6–2.6)
MCHC RBC-ENTMCNC: 31.1 PG — SIGNIFICANT CHANGE UP (ref 27–34)
MCHC RBC-ENTMCNC: 33.6 GM/DL — SIGNIFICANT CHANGE UP (ref 32–36)
MCV RBC AUTO: 92.5 FL — SIGNIFICANT CHANGE UP (ref 80–100)
MONOCYTES # BLD AUTO: 0.47 K/UL — SIGNIFICANT CHANGE UP (ref 0–0.9)
MONOCYTES NFR BLD AUTO: 7.8 % — SIGNIFICANT CHANGE UP (ref 2–14)
NEUTROPHILS # BLD AUTO: 3.61 K/UL — SIGNIFICANT CHANGE UP (ref 1.8–7.4)
NEUTROPHILS NFR BLD AUTO: 60.2 % — SIGNIFICANT CHANGE UP (ref 43–77)
NT-PROBNP SERPL-SCNC: 32 PG/ML — SIGNIFICANT CHANGE UP (ref 0–125)
PLATELET # BLD AUTO: 233 K/UL — SIGNIFICANT CHANGE UP (ref 150–400)
POTASSIUM SERPL-MCNC: 3.7 MMOL/L — SIGNIFICANT CHANGE UP (ref 3.5–5.3)
POTASSIUM SERPL-SCNC: 3.7 MMOL/L — SIGNIFICANT CHANGE UP (ref 3.5–5.3)
PROT SERPL-MCNC: 8 GM/DL — SIGNIFICANT CHANGE UP (ref 6–8.3)
PROTHROM AB SERPL-ACNC: 11.6 SEC — SIGNIFICANT CHANGE UP (ref 10–12.9)
RBC # BLD: 4.28 M/UL — SIGNIFICANT CHANGE UP (ref 3.8–5.2)
RBC # FLD: 12.2 % — SIGNIFICANT CHANGE UP (ref 10.3–14.5)
SODIUM SERPL-SCNC: 135 MMOL/L — SIGNIFICANT CHANGE UP (ref 135–145)
TROPONIN I SERPL-MCNC: <0.015 NG/ML — SIGNIFICANT CHANGE UP (ref 0.01–0.04)
TROPONIN I SERPL-MCNC: <0.015 NG/ML — SIGNIFICANT CHANGE UP (ref 0.01–0.04)
WBC # BLD: 6 K/UL — SIGNIFICANT CHANGE UP (ref 3.8–10.5)
WBC # FLD AUTO: 6 K/UL — SIGNIFICANT CHANGE UP (ref 3.8–10.5)

## 2020-02-03 PROCEDURE — 84484 ASSAY OF TROPONIN QUANT: CPT

## 2020-02-03 PROCEDURE — 84702 CHORIONIC GONADOTROPIN TEST: CPT

## 2020-02-03 PROCEDURE — 83880 ASSAY OF NATRIURETIC PEPTIDE: CPT

## 2020-02-03 PROCEDURE — 83690 ASSAY OF LIPASE: CPT

## 2020-02-03 PROCEDURE — 83735 ASSAY OF MAGNESIUM: CPT

## 2020-02-03 PROCEDURE — 99284 EMERGENCY DEPT VISIT MOD MDM: CPT | Mod: 25

## 2020-02-03 PROCEDURE — 85379 FIBRIN DEGRADATION QUANT: CPT

## 2020-02-03 PROCEDURE — 71046 X-RAY EXAM CHEST 2 VIEWS: CPT

## 2020-02-03 PROCEDURE — 80053 COMPREHEN METABOLIC PANEL: CPT

## 2020-02-03 PROCEDURE — 99285 EMERGENCY DEPT VISIT HI MDM: CPT

## 2020-02-03 PROCEDURE — 93010 ELECTROCARDIOGRAM REPORT: CPT

## 2020-02-03 PROCEDURE — 36415 COLL VENOUS BLD VENIPUNCTURE: CPT

## 2020-02-03 PROCEDURE — 85610 PROTHROMBIN TIME: CPT

## 2020-02-03 PROCEDURE — 85730 THROMBOPLASTIN TIME PARTIAL: CPT

## 2020-02-03 PROCEDURE — 85025 COMPLETE CBC W/AUTO DIFF WBC: CPT

## 2020-02-03 PROCEDURE — 71046 X-RAY EXAM CHEST 2 VIEWS: CPT | Mod: 26

## 2020-02-03 PROCEDURE — 93005 ELECTROCARDIOGRAM TRACING: CPT

## 2020-02-03 RX ORDER — SODIUM CHLORIDE 9 MG/ML
1000 INJECTION INTRAMUSCULAR; INTRAVENOUS; SUBCUTANEOUS ONCE
Refills: 0 | Status: COMPLETED | OUTPATIENT
Start: 2020-02-03 | End: 2020-02-03

## 2020-02-03 RX ADMIN — SODIUM CHLORIDE 1000 MILLILITER(S): 9 INJECTION INTRAMUSCULAR; INTRAVENOUS; SUBCUTANEOUS at 11:35

## 2020-02-03 NOTE — ED PROVIDER NOTE - CLINICAL SUMMARY MEDICAL DECISION MAKING FREE TEXT BOX
Pt with lightheadedness with presyncope associated with SOB and transient LUE "numbness". Never had CP. Neuro exam nonfocal. Will check troponin x2, ddimer, consider outpatient cardio follow up.
independent

## 2020-02-03 NOTE — ED PROVIDER NOTE - NS ED ROS FT
Review of Systems:  	•	CONSTITUTIONAL: no fever  	•	SKIN: no rash  	•	RESPIRATORY: +shortness of breath  	•	CARDIAC: no chest pain, no palpitations  	•	GI:  no abd pain, no nausea, no vomiting, no diarrhea  	•	GENITO-URINARY:  no dysuria; no hematuria    	•	MUSCULOSKELETAL:  no back pain  	•	NEUROLOGIC: no weakness. +dizziness  	•	ALLERGY: no rhinitis  	•	PSYSCHIATRIC: no anxiety Review of Systems:  	•	CONSTITUTIONAL: no fever  	•	SKIN: no rash  	•	RESPIRATORY: +shortness of breath  	•	CARDIAC: no chest pain, no palpitations  	•	GI:  no abd pain, no nausea, no vomiting, no diarrhea  	•	GENITO-URINARY:  no dysuria; no hematuria    	•	MUSCULOSKELETAL:  no back pain  	•	NEUROLOGIC: no weakness. +lightheadedness.  	•	ALLERGY: no rhinitis  	•	PSYSCHIATRIC: no anxiety

## 2020-02-03 NOTE — ED PROVIDER NOTE - PHYSICAL EXAMINATION
*GEN: NAD; well appearing; A+O x3   *HEAD: NC/AT   *EYES/NOSE: PERRL & EOMI b/l  *THROAT: airway patent, moist mucous membranes  *NECK: Neck supple, no masses  *PULMONARY: CTA b/l, symmetric breath sounds.   *CARDIAC: s1s2, regular rhythm, no Murmur  *ABDOMEN:  ND, NT, soft, no guarding, no rebound, no masses   *BACK: no CVA tenderness, Normal  spine   *EXTREMITIES: symmetric pulses, 2+ dp & radial pulses, capillary refill < 2 seconds, no cyanosis, no edema   *SKIN: no rash or bruising   *NEUROLOGIC: CN 2-12 intact, normal finger to nose & heel to shin; no dysdiadochokinesis; equal & normal strength & sensation in b/l UE/LE; full active & passive ROM in all extremities,  no pronator drift, normal patellar reflex, normal gait, romberg sign negative   *PSYCH: insight and judgment nl, memory nl, affect nl, thought nl

## 2020-02-03 NOTE — ED PROVIDER NOTE - OBJECTIVE STATEMENT
Pertinent HPI/PMH/PSH/FHx/SHx and Review of Systems contained within  HPI: 51 y/o female p/w dizziness. Pt states she felt lightheaded around 8am today. Pt states she drove to work and felt SOB. Denies CP today. Pt had a banana and coffee today which she states is less than what she usually eats. Pt reports she was in the car for 5 hours yesterday driving home from [place]. On 81mg ASA. Not on birth control or hormone therapy. No other complaints at this time.   PMH/PSH relevant for: Afib, SVT, s/p ablations x2  ROS negative for: fever, Chest pain, Nausea, vomiting, diarrhea, abdominal pain, dysuria, numbness  FamilyHx and SocialHx not otherwise contributory Pertinent HPI/PMH/PSH/FHx/SHx and Review of Systems contained within  HPI: 49 y/o female p/w dizziness. Pt states she felt lightheaded around 8am today. Pt states she drove to work and felt SOB. Denies CP today. Pt had a banana and coffee today which she states is less than what she usually eats. Pt reports she was in the car for 5 hours yesterday driving home from [place]. On 81mg ASA. Not on birth control or hormone therapy. Pt had normal stress test in Summer 2019. Nonsmoker. No other complaints at this time.   PMH/PSH relevant for: Afib, SVT, s/p ablations x2  ROS negative for: fever, Chest pain, Nausea, vomiting, diarrhea, abdominal pain, dysuria, numbness  FamilyHx and SocialHx not otherwise contributory Pertinent HPI/PMH/PSH/FHx/SHx and Review of Systems contained within  HPI: 49 y/o female p/w dizziness. Pt states she felt lightheaded around 8am today. Pt states she drove to work and felt SOB. Denies CP today. Pt had a banana and coffee today which she states is less than what she usually eats. Pt reports she was in the car for 5 hours yesterday driving home from [place]. On 81mg ASA. Not on birth control or hormone therapy. Pt had normal stress test in Summer 2019. Nonsmoker. No other complaints at this time.   PMH/PSH relevant for: SVT, s/p ablations x2  ROS negative for: fever, Chest pain, Nausea, vomiting, diarrhea, abdominal pain, dysuria, numbness  FamilyHx and SocialHx not otherwise contributory

## 2020-02-03 NOTE — ED PROVIDER NOTE - PATIENT PORTAL LINK FT
You can access the FollowMyHealth Patient Portal offered by [facility] by registering at the following website: [email or web address]. By joining [employer]’s FollowMyHealth portal, you will also be able to view your health information using other applications (apps) compatible with our system.

## 2020-02-03 NOTE — ED PROVIDER NOTE - NSFOLLOWUPINSTRUCTIONS_ED_ALL_ED_FT
FOLLOW UP WITH PMD  & CARDIOLOGIST WITHIN 1-2DAYS, CALL TO MAKE APPOINTMENT  COME BACK TO ED IF YOUR CONDITION WORSENS OR IF YOU DEVELOP FEVER GREATER THAN 100.4F, CHEST PAIN,  SHORTNESS OF BREATH OR ANY OTHER SYMPTOMS CONCERNING TO YOU  TAKE TYLENOL (ACETAMINOPHEN) 650 MG EVERY 6 HOURS AS NEEDED FOR PAIN  TAKE IBUPROFEN (MOTRIN)  600 MG EVERY 6 HOURS AS NEEDED FOR PAIN  IF YOU WERE PRESRCIBED ANY MEDICATIONS FROM TODAY'S VISIT, TAKE THEM AS DIRECTED     Dizziness    WHAT YOU NEED TO KNOW:    Dizziness is a feeling of being off balance or unsteady. Common causes of dizziness are an inner ear fluid imbalance or a lack of oxygen in your blood. Dizziness may be acute (lasts 3 days or less) or chronic (lasts longer than 3 days). You may have dizzy spells that last from seconds to a few hours.     DISCHARGE INSTRUCTIONS:    Return to the emergency department if:     You have a headache and a stiff neck.      You have shaking chills and a fever.       You vomit over and over with no relief.       Your vomit or bowel movements are red or black.       You have pain in your chest, back, or abdomen.       You have numbness, especially in your face, arms, or legs.       You have trouble moving your arms or legs.       You are confused.     Contact your healthcare provider if:     You have a fever.       Your symptoms do not get better with treatment.       You have questions or concerns about your condition or care.     Manage your symptoms:     Do not drive or operate heavy machinery when you are dizzy.       Get up slowly from sitting or lying down.       Drink plenty of liquids. Liquids help prevent dehydration. Ask how much liquid to drink each day and which liquids are best for you.    Follow up with your healthcare provider as directed: Write down your questions so you remember to ask them during your visits.

## 2020-09-04 ENCOUNTER — TRANSCRIPTION ENCOUNTER (OUTPATIENT)
Age: 50
End: 2020-09-04

## 2020-11-28 ENCOUNTER — TRANSCRIPTION ENCOUNTER (OUTPATIENT)
Age: 50
End: 2020-11-28

## 2021-05-27 ENCOUNTER — TRANSCRIPTION ENCOUNTER (OUTPATIENT)
Age: 51
End: 2021-05-27

## 2021-06-08 NOTE — PROVIDER CONTACT NOTE (CRITICAL VALUE NOTIFICATION) - NS PROVIDER READ BACK
yes

## 2021-07-28 ENCOUNTER — NON-APPOINTMENT (OUTPATIENT)
Age: 51
End: 2021-07-28

## 2021-07-28 PROBLEM — I47.1 SUPRAVENTRICULAR TACHYCARDIA: Chronic | Status: ACTIVE | Noted: 2020-02-03

## 2021-09-23 ENCOUNTER — APPOINTMENT (OUTPATIENT)
Dept: OBGYN | Facility: CLINIC | Age: 51
End: 2021-09-23
Payer: COMMERCIAL

## 2021-09-23 VITALS
WEIGHT: 269 LBS | BODY MASS INDEX: 39.84 KG/M2 | HEIGHT: 69 IN | SYSTOLIC BLOOD PRESSURE: 112 MMHG | DIASTOLIC BLOOD PRESSURE: 74 MMHG

## 2021-09-23 PROCEDURE — 99396 PREV VISIT EST AGE 40-64: CPT

## 2021-09-24 NOTE — HISTORY OF PRESENT ILLNESS
[FreeTextEntry1] : 52 y/o G0 with LMP: 2018 presents for annual visit. \par Menstrual Triad: 12 x 28 x 5\par \par No changes in bowel nor bladder. Some hot flashes, occasionally. No PMB.\par Rt ovarian dermoid s/p oopherectomy.\par 2019 US pelvis 1.9 cm left ovarian simple cyst\par \par \par  [Mammogramdate] : 11/19 [PapSmeardate] : 08/19

## 2021-09-24 NOTE — PHYSICAL EXAM
[Appropriately responsive] : appropriately responsive [No Acute Distress] : no acute distress [Alert] : alert [No Lymphadenopathy] : no lymphadenopathy [Non-tender] : non-tender [Soft] : soft [Non-distended] : non-distended [No HSM] : No HSM [No Lesions] : no lesions [No Mass] : no mass [Oriented x3] : oriented x3 [Examination Of The Breasts] : a normal appearance [No Masses] : no breast masses were palpable [Labia Minora] : normal [Labia Majora] : normal [Normal] : normal [Uterine Adnexae] : normal [FreeTextEntry9] : Otoniel negative

## 2021-11-22 ENCOUNTER — RESULT REVIEW (OUTPATIENT)
Age: 51
End: 2021-11-22

## 2021-11-22 ENCOUNTER — OUTPATIENT (OUTPATIENT)
Dept: OUTPATIENT SERVICES | Facility: HOSPITAL | Age: 51
LOS: 1 days | End: 2021-11-22
Payer: COMMERCIAL

## 2021-11-22 ENCOUNTER — APPOINTMENT (OUTPATIENT)
Dept: MAMMOGRAPHY | Facility: CLINIC | Age: 51
End: 2021-11-22
Payer: COMMERCIAL

## 2021-11-22 ENCOUNTER — APPOINTMENT (OUTPATIENT)
Dept: ULTRASOUND IMAGING | Facility: CLINIC | Age: 51
End: 2021-11-22
Payer: COMMERCIAL

## 2021-11-22 DIAGNOSIS — Z00.8 ENCOUNTER FOR OTHER GENERAL EXAMINATION: ICD-10-CM

## 2021-11-22 PROCEDURE — 76641 ULTRASOUND BREAST COMPLETE: CPT | Mod: 26,50

## 2021-11-22 PROCEDURE — 77067 SCR MAMMO BI INCL CAD: CPT | Mod: 26

## 2021-11-22 PROCEDURE — 77063 BREAST TOMOSYNTHESIS BI: CPT | Mod: 26

## 2021-11-22 PROCEDURE — 76641 ULTRASOUND BREAST COMPLETE: CPT

## 2021-11-22 PROCEDURE — 77067 SCR MAMMO BI INCL CAD: CPT

## 2021-11-22 PROCEDURE — 77063 BREAST TOMOSYNTHESIS BI: CPT

## 2022-03-11 ENCOUNTER — EMERGENCY (EMERGENCY)
Facility: HOSPITAL | Age: 52
LOS: 0 days | Discharge: HOME | End: 2022-03-11
Attending: FAMILY MEDICINE
Payer: COMMERCIAL

## 2022-03-11 VITALS
SYSTOLIC BLOOD PRESSURE: 122 MMHG | DIASTOLIC BLOOD PRESSURE: 82 MMHG | TEMPERATURE: 98 F | OXYGEN SATURATION: 98 % | RESPIRATION RATE: 18 BRPM | HEART RATE: 100 BPM

## 2022-03-11 VITALS — HEIGHT: 69 IN | WEIGHT: 270.07 LBS

## 2022-03-11 DIAGNOSIS — I47.1 SUPRAVENTRICULAR TACHYCARDIA: ICD-10-CM

## 2022-03-11 DIAGNOSIS — R07.89 OTHER CHEST PAIN: ICD-10-CM

## 2022-03-11 DIAGNOSIS — I48.91 UNSPECIFIED ATRIAL FIBRILLATION: ICD-10-CM

## 2022-03-11 DIAGNOSIS — R00.2 PALPITATIONS: ICD-10-CM

## 2022-03-11 DIAGNOSIS — R00.0 TACHYCARDIA, UNSPECIFIED: ICD-10-CM

## 2022-03-11 DIAGNOSIS — Z20.822 CONTACT WITH AND (SUSPECTED) EXPOSURE TO COVID-19: ICD-10-CM

## 2022-03-11 DIAGNOSIS — R06.02 SHORTNESS OF BREATH: ICD-10-CM

## 2022-03-11 LAB
ADD ON TEST-SPECIMEN IN LAB: SIGNIFICANT CHANGE UP
ALBUMIN SERPL ELPH-MCNC: 3.4 G/DL — SIGNIFICANT CHANGE UP (ref 3.3–5)
ALP SERPL-CCNC: 77 U/L — SIGNIFICANT CHANGE UP (ref 40–120)
ALT FLD-CCNC: 51 U/L — SIGNIFICANT CHANGE UP (ref 12–78)
ANION GAP SERPL CALC-SCNC: 3 MMOL/L — LOW (ref 5–17)
AST SERPL-CCNC: 23 U/L — SIGNIFICANT CHANGE UP (ref 15–37)
BASOPHILS # BLD AUTO: 0.04 K/UL — SIGNIFICANT CHANGE UP (ref 0–0.2)
BASOPHILS NFR BLD AUTO: 0.4 % — SIGNIFICANT CHANGE UP (ref 0–2)
BILIRUB SERPL-MCNC: 0.2 MG/DL — SIGNIFICANT CHANGE UP (ref 0.2–1.2)
BUN SERPL-MCNC: 17 MG/DL — SIGNIFICANT CHANGE UP (ref 7–23)
CALCIUM SERPL-MCNC: 9.2 MG/DL — SIGNIFICANT CHANGE UP (ref 8.5–10.1)
CHLORIDE SERPL-SCNC: 109 MMOL/L — HIGH (ref 96–108)
CO2 SERPL-SCNC: 27 MMOL/L — SIGNIFICANT CHANGE UP (ref 22–31)
CREAT SERPL-MCNC: 1.14 MG/DL — SIGNIFICANT CHANGE UP (ref 0.5–1.3)
D DIMER BLD IA.RAPID-MCNC: <150 NG/ML DDU — SIGNIFICANT CHANGE UP
EGFR: 58 ML/MIN/1.73M2 — LOW
EOSINOPHIL # BLD AUTO: 0.17 K/UL — SIGNIFICANT CHANGE UP (ref 0–0.5)
EOSINOPHIL NFR BLD AUTO: 1.9 % — SIGNIFICANT CHANGE UP (ref 0–6)
GLUCOSE SERPL-MCNC: 129 MG/DL — HIGH (ref 70–99)
HCT VFR BLD CALC: 39.4 % — SIGNIFICANT CHANGE UP (ref 34.5–45)
HGB BLD-MCNC: 12.8 G/DL — SIGNIFICANT CHANGE UP (ref 11.5–15.5)
IMM GRANULOCYTES NFR BLD AUTO: 0.3 % — SIGNIFICANT CHANGE UP (ref 0–1.5)
LYMPHOCYTES # BLD AUTO: 2.99 K/UL — SIGNIFICANT CHANGE UP (ref 1–3.3)
LYMPHOCYTES # BLD AUTO: 33.5 % — SIGNIFICANT CHANGE UP (ref 13–44)
MCHC RBC-ENTMCNC: 29.9 PG — SIGNIFICANT CHANGE UP (ref 27–34)
MCHC RBC-ENTMCNC: 32.5 GM/DL — SIGNIFICANT CHANGE UP (ref 32–36)
MCV RBC AUTO: 92.1 FL — SIGNIFICANT CHANGE UP (ref 80–100)
MONOCYTES # BLD AUTO: 0.94 K/UL — HIGH (ref 0–0.9)
MONOCYTES NFR BLD AUTO: 10.5 % — SIGNIFICANT CHANGE UP (ref 2–14)
NEUTROPHILS # BLD AUTO: 4.75 K/UL — SIGNIFICANT CHANGE UP (ref 1.8–7.4)
NEUTROPHILS NFR BLD AUTO: 53.4 % — SIGNIFICANT CHANGE UP (ref 43–77)
PLATELET # BLD AUTO: 242 K/UL — SIGNIFICANT CHANGE UP (ref 150–400)
POTASSIUM SERPL-MCNC: 4.4 MMOL/L — SIGNIFICANT CHANGE UP (ref 3.5–5.3)
POTASSIUM SERPL-SCNC: 4.4 MMOL/L — SIGNIFICANT CHANGE UP (ref 3.5–5.3)
PROT SERPL-MCNC: 7.7 GM/DL — SIGNIFICANT CHANGE UP (ref 6–8.3)
RBC # BLD: 4.28 M/UL — SIGNIFICANT CHANGE UP (ref 3.8–5.2)
RBC # FLD: 12.4 % — SIGNIFICANT CHANGE UP (ref 10.3–14.5)
SODIUM SERPL-SCNC: 139 MMOL/L — SIGNIFICANT CHANGE UP (ref 135–145)
TROPONIN I, HIGH SENSITIVITY RESULT: 7.69 NG/L — SIGNIFICANT CHANGE UP
TSH SERPL-MCNC: 5.53 UU/ML — HIGH (ref 0.34–4.82)
WBC # BLD: 8.92 K/UL — SIGNIFICANT CHANGE UP (ref 3.8–10.5)
WBC # FLD AUTO: 8.92 K/UL — SIGNIFICANT CHANGE UP (ref 3.8–10.5)

## 2022-03-11 PROCEDURE — 93010 ELECTROCARDIOGRAM REPORT: CPT | Mod: 76

## 2022-03-11 PROCEDURE — 71045 X-RAY EXAM CHEST 1 VIEW: CPT | Mod: 26

## 2022-03-11 PROCEDURE — 84443 ASSAY THYROID STIM HORMONE: CPT

## 2022-03-11 PROCEDURE — 71045 X-RAY EXAM CHEST 1 VIEW: CPT

## 2022-03-11 PROCEDURE — 36415 COLL VENOUS BLD VENIPUNCTURE: CPT

## 2022-03-11 PROCEDURE — U0005: CPT

## 2022-03-11 PROCEDURE — 99285 EMERGENCY DEPT VISIT HI MDM: CPT

## 2022-03-11 PROCEDURE — 85379 FIBRIN DEGRADATION QUANT: CPT

## 2022-03-11 PROCEDURE — 84484 ASSAY OF TROPONIN QUANT: CPT

## 2022-03-11 PROCEDURE — U0003: CPT

## 2022-03-11 PROCEDURE — 93005 ELECTROCARDIOGRAM TRACING: CPT

## 2022-03-11 PROCEDURE — 85025 COMPLETE CBC W/AUTO DIFF WBC: CPT

## 2022-03-11 PROCEDURE — 84439 ASSAY OF FREE THYROXINE: CPT

## 2022-03-11 PROCEDURE — 99285 EMERGENCY DEPT VISIT HI MDM: CPT | Mod: 25

## 2022-03-11 PROCEDURE — 80053 COMPREHEN METABOLIC PANEL: CPT

## 2022-03-11 RX ORDER — SODIUM CHLORIDE 9 MG/ML
1000 INJECTION INTRAMUSCULAR; INTRAVENOUS; SUBCUTANEOUS ONCE
Refills: 0 | Status: COMPLETED | OUTPATIENT
Start: 2022-03-11 | End: 2022-03-11

## 2022-03-11 RX ADMIN — SODIUM CHLORIDE 1000 MILLILITER(S): 9 INJECTION INTRAMUSCULAR; INTRAVENOUS; SUBCUTANEOUS at 21:26

## 2022-03-11 NOTE — ED PROVIDER NOTE - NSFOLLOWUPINSTRUCTIONS_ED_ALL_ED_FT
Heart Palpitations    WHAT YOU NEED TO KNOW:    Heart palpitations are feelings that your heart races, jumps, throbs, or flutters. You may feel extra beats, no beats for a short time, or skipped beats. You may have these feelings in your chest, throat, or neck. They may happen when you are sitting, standing, or lying. Heart palpitations may be frightening, but are usually not caused by a serious problem.     DISCHARGE INSTRUCTIONS:    Call 911 or have someone else call for any of the following:     You have any of the following signs of a heart attack:   Squeezing, pressure, or pain in your chest       and any of the following:   Discomfort or pain in your back, neck, jaw, stomach, or arm       Shortness of breath      Nausea or vomiting      Lightheadedness or a sudden cold sweat      You have any of the following signs of a stroke:   Numbness or drooping on one side of your face       Weakness in an arm or leg      Confusion or difficulty speaking      Dizziness, a severe headache, or vision loss      You faint or lose consciousness.     Return to the emergency department if:     Your palpitations happen more often or get more intense.         Contact your healthcare provider if:     You have new or worsening swelling in your feet or ankles.      You have questions or concerns about your condition or care.    Follow up with your healthcare provider as directed: You may need to follow up with a cardiologist. You may need tests to check for heart problems that cause palpitations. Write down your questions so you remember to ask them during your visits.     Keep a record: Write down when your palpitations start and stop, what you were doing when they started, and your symptoms. Keep track of what you ate or drank within a few hours of your palpitations. Include anything that seemed to help your symptoms, such as lying down or holding your breath. This record will help you and your healthcare provider learn what triggers your palpitations. Bring this record with you to your follow up visits.    Help prevent heart palpitations:     Manage stress and anxiety. Find ways to relax such as listening to music, meditating, or doing yoga. Exercise can also help decrease stress and anxiety. Talk to someone you trust about your stress or anxiety. You can also talk to a therapist.       Get plenty of sleep every night. Ask your healthcare provider how much sleep you need each night.       Do not drink caffeine or alcohol. Caffeine and alcohol can make your palpitations worse. Caffeine is found in soda, coffee, tea, chocolate, and drinks that increase your energy.       Do not smoke. Nicotine and other chemicals in cigarettes and cigars may damage your heart and blood vessels. Ask your healthcare provider for information if you currently smoke and need help to quit. E-cigarettes or smokeless tobacco still contain nicotine. Talk to your healthcare provider before you use these products.       Do not use illegal drugs. Talk to your healthcare provider if you use illegal drugs and want help to quit. Please follow up with your Primary MD in 2-3 days. Return to ED immediately for any new or concerning symptoms or worsening symptoms    Heart Palpitations    WHAT YOU NEED TO KNOW:    Heart palpitations are feelings that your heart races, jumps, throbs, or flutters. You may feel extra beats, no beats for a short time, or skipped beats. You may have these feelings in your chest, throat, or neck. They may happen when you are sitting, standing, or lying. Heart palpitations may be frightening, but are usually not caused by a serious problem.     DISCHARGE INSTRUCTIONS:    Call 911 or have someone else call for any of the following:     You have any of the following signs of a heart attack:   Squeezing, pressure, or pain in your chest       and any of the following:   Discomfort or pain in your back, neck, jaw, stomach, or arm       Shortness of breath      Nausea or vomiting      Lightheadedness or a sudden cold sweat      You have any of the following signs of a stroke:   Numbness or drooping on one side of your face       Weakness in an arm or leg      Confusion or difficulty speaking      Dizziness, a severe headache, or vision loss      You faint or lose consciousness.     Return to the emergency department if:     Your palpitations happen more often or get more intense.         Contact your healthcare provider if:     You have new or worsening swelling in your feet or ankles.      You have questions or concerns about your condition or care.    Follow up with your healthcare provider as directed: You may need to follow up with a cardiologist. You may need tests to check for heart problems that cause palpitations. Write down your questions so you remember to ask them during your visits.     Keep a record: Write down when your palpitations start and stop, what you were doing when they started, and your symptoms. Keep track of what you ate or drank within a few hours of your palpitations. Include anything that seemed to help your symptoms, such as lying down or holding your breath. This record will help you and your healthcare provider learn what triggers your palpitations. Bring this record with you to your follow up visits.    Help prevent heart palpitations:     Manage stress and anxiety. Find ways to relax such as listening to music, meditating, or doing yoga. Exercise can also help decrease stress and anxiety. Talk to someone you trust about your stress or anxiety. You can also talk to a therapist.       Get plenty of sleep every night. Ask your healthcare provider how much sleep you need each night.       Do not drink caffeine or alcohol. Caffeine and alcohol can make your palpitations worse. Caffeine is found in soda, coffee, tea, chocolate, and drinks that increase your energy.       Do not smoke. Nicotine and other chemicals in cigarettes and cigars may damage your heart and blood vessels. Ask your healthcare provider for information if you currently smoke and need help to quit. E-cigarettes or smokeless tobacco still contain nicotine. Talk to your healthcare provider before you use these products.       Do not use illegal drugs. Talk to your healthcare provider if you use illegal drugs and want help to quit.

## 2022-03-11 NOTE — ED PROVIDER NOTE - PROGRESS NOTE DETAILS
Results reviewed and discussed with pt. Pt feeling well throughout stay in ED, no event on monitor. made aware of elevated tsh, perhaps subclinical hypothyroid, will fu with PMD as well as Dr. zaragoza this week. Pt asked to return to ED immediately for any new or concerning sx or worsening. Pt acknowledges and understands plan -Jon Lovell PA-C PA note reviewed, appreciated and pt evaluated. Pt with hx of ablation twice due to svt, who developed palpitations and episode of chest tightness while eating today. Pain was nonradiating, not assoc with nausea. Resolved on arrival to er. Lasted about one hour. Pt is nonsmoker occ etoh no drugs. Had one drink today with her meal. No recent trave or illness, no estrogens.Pt alert, heart slightly rapid but regular rhythm, lungs clear abd soft pos bs nt ext no cce. Pt with sinus tach with no st changes. Will do labs inc enzyme and ddimer, cxr. Possible episode of svt resolved? Will monitor. Remington ZAMORA

## 2022-03-11 NOTE — ED PROVIDER NOTE - CLINICAL SUMMARY MEDICAL DECISION MAKING FREE TEXT BOX
51 y/o F PMHx of SVT presents palpitations. EKG x2 normal, labs unremarkable other then mildly elevated tsh. Will dc home with cardio fu

## 2022-03-11 NOTE — ED ADULT NURSE NOTE - OBJECTIVE STATEMENT
pt presents to the ED c/o palpitations and chest tightness that started while making dinner tonight. Pt states she has a hx of ablations for SVT, started to experience heart palpitations, looked at her apple watch which showed a HR of 130. Pt states she had one martini tonight at dinner time. Pt denies cp, palpitations, sob at this time. Pt is A+Ox4 following commands appropriate with equal unlabored respirations. pt placed on CCM, EKG obtained and PIV inserted. Safety is maintained

## 2022-03-11 NOTE — ED PROVIDER NOTE - PATIENT PORTAL LINK FT
You can access the FollowMyHealth Patient Portal offered by [facility] by registering at the following website: [email or web address]. By joining [email or web address]’s FollowMyHealth portal, you will also be able to view your health information using other applications (apps) compatible with our system.

## 2022-03-11 NOTE — ED PROVIDER NOTE - PHYSICAL EXAMINATION
Constitutional: NAD AAOx3  Eyes: PERRLA EOMI  Head: NCAT  Mouth: MMM  Cardiac: s1s2. rrr  Lungs: CTA B/L. No accessory muscle use  Abd: soft, nd. NTTP. no r/g/r  Neuro: CN2-12 intact  Ext: No LE edema. neg homans. 2+ pulses   Skin: No rashes

## 2022-03-11 NOTE — ED ADULT TRIAGE NOTE - CHIEF COMPLAINT QUOTE
Pt A&Ox4, presents to the ED c/o chest tightness, SOB, and feeling light headed since 19:03. PMH of SVT and ablation in 2018.  and SpO2 95% in triage. Pt reports having a martini at 18:30. No medication PTA. Pt sent for STAT EKG.

## 2022-03-11 NOTE — ED PROVIDER NOTE - OBJECTIVE STATEMENT
51 y/o f PMHx SVTx2 with previous ablation, endometriosis presents w/ palpitations. pt states she was eating and began feeling her heart fluttering, mildly SOB and chest tightness. Denies fever/chills, n/v/d, abd pain, HA, urinary sx, LE swelling, recent travel/surg/ca hx, or other complaints at this time. -Jon Lovell PA-C

## 2022-05-21 NOTE — H&P ADULT - CLICK TO LAUNCH ORM
.
Alert and oriented, no focal deficits, no motor or sensory deficits. CN 2-12 intact, DTR ++/++, motor, tone, sensation intact bilaterally.  Neg Rhomberg.

## 2022-05-22 ENCOUNTER — NON-APPOINTMENT (OUTPATIENT)
Age: 52
End: 2022-05-22

## 2022-10-21 ENCOUNTER — APPOINTMENT (OUTPATIENT)
Dept: OBGYN | Facility: CLINIC | Age: 52
End: 2022-10-21

## 2022-12-04 ENCOUNTER — NON-APPOINTMENT (OUTPATIENT)
Age: 52
End: 2022-12-04

## 2022-12-21 ENCOUNTER — NON-APPOINTMENT (OUTPATIENT)
Age: 52
End: 2022-12-21

## 2023-02-22 ENCOUNTER — NON-APPOINTMENT (OUTPATIENT)
Age: 53
End: 2023-02-22

## 2023-06-16 ENCOUNTER — APPOINTMENT (OUTPATIENT)
Dept: OBGYN | Facility: CLINIC | Age: 53
End: 2023-06-16
Payer: COMMERCIAL

## 2023-06-16 VITALS
DIASTOLIC BLOOD PRESSURE: 70 MMHG | HEIGHT: 69 IN | SYSTOLIC BLOOD PRESSURE: 110 MMHG | BODY MASS INDEX: 39.99 KG/M2 | WEIGHT: 270 LBS

## 2023-06-16 DIAGNOSIS — Z01.419 ENCOUNTER FOR GYNECOLOGICAL EXAMINATION (GENERAL) (ROUTINE) W/OUT ABNORMAL FINDINGS: ICD-10-CM

## 2023-06-16 DIAGNOSIS — Z12.4 ENCOUNTER FOR SCREENING FOR MALIGNANT NEOPLASM OF CERVIX: ICD-10-CM

## 2023-06-16 PROCEDURE — 82270 OCCULT BLOOD FECES: CPT

## 2023-06-16 PROCEDURE — 99396 PREV VISIT EST AGE 40-64: CPT

## 2023-06-16 NOTE — PHYSICAL EXAM
[Appropriately responsive] : appropriately responsive [Alert] : alert [No Acute Distress] : no acute distress [No Lymphadenopathy] : no lymphadenopathy [Soft] : soft [Non-tender] : non-tender [Non-distended] : non-distended [No HSM] : No HSM [No Lesions] : no lesions [No Mass] : no mass [Oriented x3] : oriented x3 [Examination Of The Breasts] : a normal appearance [No Masses] : no breast masses were palpable [Labia Majora] : normal [Labia Minora] : normal [Normal] : normal [Uterine Adnexae] : normal [FreeTextEntry9] : Otoniel neg

## 2023-06-16 NOTE — HISTORY OF PRESENT ILLNESS
[FreeTextEntry1] : 50 y/o G0 with LMP: 2018 presents for annual visit. \par \par Menstrual Triad: 12 x 28 x 5\par \par No changes in bowel nor bladder. Some hot flashes, occasionally. No PMB.\par Left ovarian dermoid s/p oopherectomy.\par H/o endometriosis with "chocolate cyst" removal.\par Rare hot flashes.\par 2019 US pelvis 1.9 cm left ovarian simple cyst\par \par Med:\par SVT - s/p ablation 2018\par Endometriosis\par \par SX:\par 2011 Left ovarian cystectomy\par \par \par  [Mammogramdate] : 11/22/21 [TextBox_19] : BIRAD 2 [BreastSonogramDate] : 11/22/21 [PapSmeardate] : 9/21 [TextBox_31] : benign [BoneDensityDate] : none [ColonoscopyDate] : 2018 [TextBox_43] : [place], 4 polyps benign, q3 hr

## 2023-06-16 NOTE — DISCUSSION/SUMMARY
[FreeTextEntry1] : Health Maintenance:\par -Pap with HPV\par -Mammo Rx\par -TBSE\par -colonoscopy guidelines reviewed with patient\par -Achieve Vit D levels of 30-40, intake of 1100 mg daily calcium mostly thru dark green\par leafy greens and milk products, exercise 30 minutes TIW\par \par Menopausal\par discussed increasing exercise for heart and bone health.

## 2023-06-26 LAB
CYTOLOGY CVX/VAG DOC THIN PREP: NORMAL
HPV HIGH+LOW RISK DNA PNL CVX: NOT DETECTED

## 2023-07-05 ENCOUNTER — RESULT REVIEW (OUTPATIENT)
Age: 53
End: 2023-07-05

## 2023-07-05 ENCOUNTER — APPOINTMENT (OUTPATIENT)
Dept: MAMMOGRAPHY | Facility: CLINIC | Age: 53
End: 2023-07-05
Payer: COMMERCIAL

## 2023-07-05 ENCOUNTER — APPOINTMENT (OUTPATIENT)
Dept: ULTRASOUND IMAGING | Facility: CLINIC | Age: 53
End: 2023-07-05
Payer: COMMERCIAL

## 2023-07-05 ENCOUNTER — OUTPATIENT (OUTPATIENT)
Dept: OUTPATIENT SERVICES | Facility: HOSPITAL | Age: 53
LOS: 1 days | End: 2023-07-05
Payer: COMMERCIAL

## 2023-07-05 DIAGNOSIS — R92.2 INCONCLUSIVE MAMMOGRAM: ICD-10-CM

## 2023-07-05 DIAGNOSIS — Z12.31 ENCOUNTER FOR SCREENING MAMMOGRAM FOR MALIGNANT NEOPLASM OF BREAST: ICD-10-CM

## 2023-07-05 PROCEDURE — 76641 ULTRASOUND BREAST COMPLETE: CPT | Mod: 26,50

## 2023-07-05 PROCEDURE — 77067 SCR MAMMO BI INCL CAD: CPT | Mod: 26

## 2023-07-05 PROCEDURE — 77067 SCR MAMMO BI INCL CAD: CPT

## 2023-07-05 PROCEDURE — 77063 BREAST TOMOSYNTHESIS BI: CPT | Mod: 26

## 2023-07-05 PROCEDURE — 76641 ULTRASOUND BREAST COMPLETE: CPT

## 2023-07-05 PROCEDURE — 77063 BREAST TOMOSYNTHESIS BI: CPT

## 2023-11-09 ENCOUNTER — LABORATORY RESULT (OUTPATIENT)
Age: 53
End: 2023-11-09

## 2023-11-09 ENCOUNTER — APPOINTMENT (OUTPATIENT)
Dept: INTERNAL MEDICINE | Facility: CLINIC | Age: 53
End: 2023-11-09
Payer: COMMERCIAL

## 2023-11-09 VITALS
OXYGEN SATURATION: 97 % | DIASTOLIC BLOOD PRESSURE: 82 MMHG | SYSTOLIC BLOOD PRESSURE: 116 MMHG | HEIGHT: 69 IN | BODY MASS INDEX: 40.73 KG/M2 | WEIGHT: 275 LBS | TEMPERATURE: 98 F | HEART RATE: 94 BPM

## 2023-11-09 DIAGNOSIS — I47.10 SUPRAVENTRICULAR TACHYCARDIA, UNSPECIFIED: ICD-10-CM

## 2023-11-09 DIAGNOSIS — E55.9 VITAMIN D DEFICIENCY, UNSPECIFIED: ICD-10-CM

## 2023-11-09 DIAGNOSIS — Z00.00 ENCOUNTER FOR GENERAL ADULT MEDICAL EXAMINATION W/OUT ABNORMAL FINDINGS: ICD-10-CM

## 2023-11-09 DIAGNOSIS — E66.01 MORBID (SEVERE) OBESITY DUE TO EXCESS CALORIES: ICD-10-CM

## 2023-11-09 PROCEDURE — 99386 PREV VISIT NEW AGE 40-64: CPT

## 2023-11-09 RX ORDER — METHYLDOPA/HYDROCHLOROTHIAZIDE 250MG-15MG
TABLET ORAL
Refills: 0 | Status: ACTIVE | COMMUNITY

## 2023-11-09 RX ORDER — MISOPROSTOL 200 UG/1
200 TABLET ORAL
Qty: 3 | Refills: 0 | Status: DISCONTINUED | COMMUNITY
Start: 2019-08-19 | End: 2023-11-09

## 2023-11-09 RX ORDER — VITAMIN K2 90 MCG
125 MCG CAPSULE ORAL
Refills: 0 | Status: ACTIVE | COMMUNITY

## 2023-11-09 RX ORDER — MULTIVITAMIN
CAPSULE ORAL
Refills: 0 | Status: ACTIVE | COMMUNITY

## 2023-11-09 RX ORDER — PSYLLIUM HUSK 0.4 G
CAPSULE ORAL
Refills: 0 | Status: ACTIVE | COMMUNITY

## 2023-11-16 ENCOUNTER — NON-APPOINTMENT (OUTPATIENT)
Age: 53
End: 2023-11-16

## 2023-11-17 ENCOUNTER — NON-APPOINTMENT (OUTPATIENT)
Age: 53
End: 2023-11-17

## 2023-11-17 LAB
25(OH)D3 SERPL-MCNC: 25.5 NG/ML
ALBUMIN SERPL ELPH-MCNC: 4.7 G/DL
ALP BLD-CCNC: 87 U/L
ALT SERPL-CCNC: 23 U/L
ANION GAP SERPL CALC-SCNC: 12 MMOL/L
AST SERPL-CCNC: 21 U/L
BASOPHILS # BLD AUTO: 0.05 K/UL
BASOPHILS NFR BLD AUTO: 0.7 %
BILIRUB SERPL-MCNC: 0.3 MG/DL
BUN SERPL-MCNC: 14 MG/DL
CALCIUM SERPL-MCNC: 9.8 MG/DL
CHLORIDE SERPL-SCNC: 105 MMOL/L
CHOLEST SERPL-MCNC: 243 MG/DL
CO2 SERPL-SCNC: 23 MMOL/L
CREAT SERPL-MCNC: 1.04 MG/DL
EGFR: 65 ML/MIN/1.73M2
EOSINOPHIL # BLD AUTO: 0.23 K/UL
EOSINOPHIL NFR BLD AUTO: 3.4 %
ESTIMATED AVERAGE GLUCOSE: 123 MG/DL
GLUCOSE SERPL-MCNC: 103 MG/DL
HBA1C MFR BLD HPLC: 5.9 %
HCT VFR BLD CALC: 41.9 %
HDLC SERPL-MCNC: 85 MG/DL
HGB BLD-MCNC: 13.6 G/DL
IMM GRANULOCYTES NFR BLD AUTO: 0.3 %
LDLC SERPL CALC-MCNC: 139 MG/DL
LYMPHOCYTES # BLD AUTO: 2.17 K/UL
LYMPHOCYTES NFR BLD AUTO: 32.3 %
MAN DIFF?: NORMAL
MCHC RBC-ENTMCNC: 30.2 PG
MCHC RBC-ENTMCNC: 32.5 GM/DL
MCV RBC AUTO: 92.9 FL
MONOCYTES # BLD AUTO: 0.68 K/UL
MONOCYTES NFR BLD AUTO: 10.1 %
NEUTROPHILS # BLD AUTO: 3.56 K/UL
NEUTROPHILS NFR BLD AUTO: 53.2 %
NONHDLC SERPL-MCNC: 158 MG/DL
PLATELET # BLD AUTO: 244 K/UL
POTASSIUM SERPL-SCNC: 4.5 MMOL/L
PROT SERPL-MCNC: 7.6 G/DL
RBC # BLD: 4.51 M/UL
RBC # FLD: 12.8 %
SODIUM SERPL-SCNC: 140 MMOL/L
TRIGL SERPL-MCNC: 107 MG/DL
TSH SERPL-ACNC: 5.09 UIU/ML
WBC # FLD AUTO: 6.71 K/UL

## 2023-11-20 DIAGNOSIS — R79.89 OTHER SPECIFIED ABNORMAL FINDINGS OF BLOOD CHEMISTRY: ICD-10-CM

## 2024-06-21 ENCOUNTER — APPOINTMENT (OUTPATIENT)
Dept: OBGYN | Facility: CLINIC | Age: 54
End: 2024-06-21
Payer: COMMERCIAL

## 2024-06-21 VITALS
WEIGHT: 271 LBS | BODY MASS INDEX: 40.14 KG/M2 | RESPIRATION RATE: 18 BRPM | DIASTOLIC BLOOD PRESSURE: 72 MMHG | SYSTOLIC BLOOD PRESSURE: 120 MMHG | HEIGHT: 69 IN

## 2024-06-21 DIAGNOSIS — Z12.39 ENCOUNTER FOR OTHER SCREENING FOR MALIGNANT NEOPLASM OF BREAST: ICD-10-CM

## 2024-06-21 DIAGNOSIS — Z01.419 ENCOUNTER FOR GYNECOLOGICAL EXAMINATION (GENERAL) (ROUTINE) W/OUT ABNORMAL FINDINGS: ICD-10-CM

## 2024-06-21 DIAGNOSIS — R92.30 DENSE BREASTS, UNSPECIFIED: ICD-10-CM

## 2024-06-21 DIAGNOSIS — Z12.31 ENCOUNTER FOR SCREENING MAMMOGRAM FOR MALIGNANT NEOPLASM OF BREAST: ICD-10-CM

## 2024-06-21 DIAGNOSIS — N60.19 DIFFUSE CYSTIC MASTOPATHY OF UNSPECIFIED BREAST: ICD-10-CM

## 2024-06-21 PROCEDURE — 99396 PREV VISIT EST AGE 40-64: CPT

## 2024-06-21 PROCEDURE — 82270 OCCULT BLOOD FECES: CPT

## 2024-06-21 PROCEDURE — 99459 PELVIC EXAMINATION: CPT

## 2024-06-21 NOTE — DISCUSSION/SUMMARY
[FreeTextEntry1] : Health Maintenance:   -Pap with HPV (2023) NL/HPV neg  -Mammo/sono Rx -TBSE -colonoscopy guidelines reviewed with patient -Achieve Vit D levels of 30-40, intake of 1100 mg daily calcium mostly thru dark green leafy greens and milk products, exercise 30 minutes TIW -Reviewed lipids & A1C, advised healthy eating and exercise.

## 2024-06-21 NOTE — HISTORY OF PRESENT ILLNESS
[FreeTextEntry1] : 55 y/o G0 with LMP: 2018 presents for annual visit.  Denies any PMB Menstrual Triad: 12 x 28 x 5 No changes in bowel nor bladder. Some hot flashes, occasionally, however getting better with Omega 3s. Rt ovarian dermoid s/p oopherectomy. 2019 US pelvis 1.9 cm left ovarian simple cyst  PMHx: SVT, last ablation @ 48yo  Sx: Cardiac ablation Left ovarian dermoid s/p oopherectomy.  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

## 2024-06-21 NOTE — PHYSICAL EXAM
[Chaperone Present] : A chaperone was present in the examining room during all aspects of the physical examination [[id number]] : A chaperone was present during the pelvic exam. [Appropriately responsive] : appropriately responsive [Alert] : alert [No Acute Distress] : no acute distress [No Lymphadenopathy] : no lymphadenopathy [Soft] : soft [Non-tender] : non-tender [Oriented x3] : oriented x3 [Examination Of The Breasts] : a normal appearance [No Masses] : no breast masses were palpable [Labia Majora] : normal [Labia Minora] : normal [Normal] : normal [Uterine Adnexae] : normal [No Tenderness] : no tenderness [Nl Sphincter Tone] : normal sphincter tone [FreeTextEntry6] : b/l nipple inversion [FreeTextEntry9] : stool guiac neg

## 2024-07-08 ENCOUNTER — APPOINTMENT (OUTPATIENT)
Dept: ULTRASOUND IMAGING | Facility: CLINIC | Age: 54
End: 2024-07-08
Payer: COMMERCIAL

## 2024-07-08 ENCOUNTER — RESULT REVIEW (OUTPATIENT)
Age: 54
End: 2024-07-08

## 2024-07-08 ENCOUNTER — APPOINTMENT (OUTPATIENT)
Dept: MAMMOGRAPHY | Facility: CLINIC | Age: 54
End: 2024-07-08
Payer: COMMERCIAL

## 2024-07-08 ENCOUNTER — OUTPATIENT (OUTPATIENT)
Dept: OUTPATIENT SERVICES | Facility: HOSPITAL | Age: 54
LOS: 1 days | End: 2024-07-08
Payer: COMMERCIAL

## 2024-07-08 DIAGNOSIS — Z12.31 ENCOUNTER FOR SCREENING MAMMOGRAM FOR MALIGNANT NEOPLASM OF BREAST: ICD-10-CM

## 2024-07-08 DIAGNOSIS — Z00.8 ENCOUNTER FOR OTHER GENERAL EXAMINATION: ICD-10-CM

## 2024-07-08 PROCEDURE — 77063 BREAST TOMOSYNTHESIS BI: CPT | Mod: 26

## 2024-07-08 PROCEDURE — 76641 ULTRASOUND BREAST COMPLETE: CPT | Mod: 26,50

## 2024-07-08 PROCEDURE — 77067 SCR MAMMO BI INCL CAD: CPT

## 2024-07-08 PROCEDURE — 77067 SCR MAMMO BI INCL CAD: CPT | Mod: 26

## 2024-07-08 PROCEDURE — 76641 ULTRASOUND BREAST COMPLETE: CPT

## 2024-07-08 PROCEDURE — 77063 BREAST TOMOSYNTHESIS BI: CPT

## 2024-11-12 ENCOUNTER — APPOINTMENT (OUTPATIENT)
Dept: INTERNAL MEDICINE | Facility: CLINIC | Age: 54
End: 2024-11-12
Payer: COMMERCIAL

## 2024-11-12 VITALS
OXYGEN SATURATION: 98 % | HEART RATE: 83 BPM | DIASTOLIC BLOOD PRESSURE: 78 MMHG | HEIGHT: 69 IN | BODY MASS INDEX: 40.88 KG/M2 | TEMPERATURE: 98.5 F | SYSTOLIC BLOOD PRESSURE: 112 MMHG | WEIGHT: 276 LBS

## 2024-11-12 DIAGNOSIS — E66.01 MORBID (SEVERE) OBESITY DUE TO EXCESS CALORIES: ICD-10-CM

## 2024-11-12 DIAGNOSIS — R73.03 PREDIABETES.: ICD-10-CM

## 2024-11-12 DIAGNOSIS — Z00.00 ENCOUNTER FOR GENERAL ADULT MEDICAL EXAMINATION W/OUT ABNORMAL FINDINGS: ICD-10-CM

## 2024-11-12 DIAGNOSIS — M54.50 LOW BACK PAIN, UNSPECIFIED: ICD-10-CM

## 2024-11-12 DIAGNOSIS — I47.10 SUPRAVENTRICULAR TACHYCARDIA, UNSPECIFIED: ICD-10-CM

## 2024-11-12 DIAGNOSIS — R79.89 OTHER SPECIFIED ABNORMAL FINDINGS OF BLOOD CHEMISTRY: ICD-10-CM

## 2024-11-12 PROCEDURE — 99396 PREV VISIT EST AGE 40-64: CPT

## 2024-11-18 LAB
ALBUMIN SERPL ELPH-MCNC: 4.5 G/DL
ALP BLD-CCNC: 83 U/L
ALT SERPL-CCNC: 22 U/L
ANION GAP SERPL CALC-SCNC: 12 MMOL/L
AST SERPL-CCNC: 19 U/L
BASOPHILS # BLD AUTO: 0.04 K/UL
BASOPHILS NFR BLD AUTO: 0.7 %
BILIRUB SERPL-MCNC: 0.2 MG/DL
BUN SERPL-MCNC: 13 MG/DL
CALCIUM SERPL-MCNC: 10.1 MG/DL
CHLORIDE SERPL-SCNC: 104 MMOL/L
CHOLEST SERPL-MCNC: 249 MG/DL
CO2 SERPL-SCNC: 24 MMOL/L
CREAT SERPL-MCNC: 1.03 MG/DL
EGFR: 65 ML/MIN/1.73M2
EOSINOPHIL # BLD AUTO: 0.2 K/UL
EOSINOPHIL NFR BLD AUTO: 3.3 %
ESTIMATED AVERAGE GLUCOSE: 128 MG/DL
GLUCOSE SERPL-MCNC: 100 MG/DL
HBA1C MFR BLD HPLC: 6.1 %
HCT VFR BLD CALC: 40.4 %
HDLC SERPL-MCNC: 85 MG/DL
HGB BLD-MCNC: 12.9 G/DL
IMM GRANULOCYTES NFR BLD AUTO: 0.2 %
LDLC SERPL CALC-MCNC: 149 MG/DL
LYMPHOCYTES # BLD AUTO: 2.29 K/UL
LYMPHOCYTES NFR BLD AUTO: 38.2 %
MAN DIFF?: NORMAL
MCHC RBC-ENTMCNC: 29.9 PG
MCHC RBC-ENTMCNC: 31.9 G/DL
MCV RBC AUTO: 93.5 FL
MONOCYTES # BLD AUTO: 0.55 K/UL
MONOCYTES NFR BLD AUTO: 9.2 %
NEUTROPHILS # BLD AUTO: 2.9 K/UL
NEUTROPHILS NFR BLD AUTO: 48.4 %
NONHDLC SERPL-MCNC: 164 MG/DL
PLATELET # BLD AUTO: 254 K/UL
POTASSIUM SERPL-SCNC: 4.8 MMOL/L
PROT SERPL-MCNC: 7.7 G/DL
RBC # BLD: 4.32 M/UL
RBC # FLD: 12.9 %
SODIUM SERPL-SCNC: 140 MMOL/L
T4 FREE SERPL-MCNC: 1.2 NG/DL
TRIGL SERPL-MCNC: 91 MG/DL
TSH SERPL-ACNC: 3.83 UIU/ML
WBC # FLD AUTO: 5.99 K/UL

## 2024-11-21 ENCOUNTER — APPOINTMENT (OUTPATIENT)
Dept: SURGERY | Facility: CLINIC | Age: 54
End: 2024-11-21
Payer: COMMERCIAL

## 2024-11-21 VITALS
TEMPERATURE: 98.2 F | BODY MASS INDEX: 40.43 KG/M2 | WEIGHT: 273 LBS | OXYGEN SATURATION: 96 % | RESPIRATION RATE: 16 BRPM | DIASTOLIC BLOOD PRESSURE: 86 MMHG | HEIGHT: 69 IN | SYSTOLIC BLOOD PRESSURE: 127 MMHG | HEART RATE: 80 BPM

## 2024-11-21 DIAGNOSIS — M62.08 SEPARATION OF MUSCLE (NONTRAUMATIC), OTHER SITE: ICD-10-CM

## 2024-11-21 PROCEDURE — 99204 OFFICE O/P NEW MOD 45 MIN: CPT

## 2025-06-24 ENCOUNTER — NON-APPOINTMENT (OUTPATIENT)
Age: 55
End: 2025-06-24

## 2025-06-24 ENCOUNTER — APPOINTMENT (OUTPATIENT)
Dept: OBGYN | Facility: CLINIC | Age: 55
End: 2025-06-24
Payer: COMMERCIAL

## 2025-06-24 VITALS
SYSTOLIC BLOOD PRESSURE: 126 MMHG | DIASTOLIC BLOOD PRESSURE: 82 MMHG | WEIGHT: 278 LBS | HEIGHT: 69 IN | BODY MASS INDEX: 41.18 KG/M2

## 2025-06-24 LAB
DATE COLLECTED: NORMAL
DATE COLLECTED: NORMAL
HEMOCCULT 2: NEGATIVE
HEMOCCULT SP1 STL QL: NEGATIVE
QUALITY CONTROL: YES
QUALITY CONTROL: YES

## 2025-06-24 PROCEDURE — 99459 PELVIC EXAMINATION: CPT | Mod: NC

## 2025-06-24 PROCEDURE — 99396 PREV VISIT EST AGE 40-64: CPT

## 2025-06-24 RX ORDER — CALCIUM CARBONATE/VITAMIN D3 600 MG-10
TABLET ORAL
Refills: 0 | Status: ACTIVE | COMMUNITY

## 2025-06-30 LAB — CYTOLOGY CVX/VAG DOC THIN PREP: NORMAL

## 2025-07-16 ENCOUNTER — APPOINTMENT (OUTPATIENT)
Dept: MAMMOGRAPHY | Facility: CLINIC | Age: 55
End: 2025-07-16
Payer: COMMERCIAL

## 2025-07-16 ENCOUNTER — APPOINTMENT (OUTPATIENT)
Dept: ULTRASOUND IMAGING | Facility: CLINIC | Age: 55
End: 2025-07-16
Payer: COMMERCIAL

## 2025-07-16 ENCOUNTER — OUTPATIENT (OUTPATIENT)
Dept: OUTPATIENT SERVICES | Facility: HOSPITAL | Age: 55
LOS: 1 days | End: 2025-07-16
Payer: COMMERCIAL

## 2025-07-16 ENCOUNTER — RESULT REVIEW (OUTPATIENT)
Age: 55
End: 2025-07-16

## 2025-07-16 DIAGNOSIS — Z00.8 ENCOUNTER FOR OTHER GENERAL EXAMINATION: ICD-10-CM

## 2025-07-16 DIAGNOSIS — Z87.42 PERSONAL HISTORY OF OTHER DISEASES OF THE FEMALE GENITAL TRACT: ICD-10-CM

## 2025-07-16 DIAGNOSIS — Z12.39 ENCOUNTER FOR OTHER SCREENING FOR MALIGNANT NEOPLASM OF BREAST: ICD-10-CM

## 2025-07-16 PROCEDURE — 76856 US EXAM PELVIC COMPLETE: CPT | Mod: 26

## 2025-07-16 PROCEDURE — 77067 SCR MAMMO BI INCL CAD: CPT | Mod: 26

## 2025-07-16 PROCEDURE — 77063 BREAST TOMOSYNTHESIS BI: CPT | Mod: 26

## 2025-07-16 PROCEDURE — 76856 US EXAM PELVIC COMPLETE: CPT

## 2025-07-16 PROCEDURE — 76830 TRANSVAGINAL US NON-OB: CPT

## 2025-07-16 PROCEDURE — 77067 SCR MAMMO BI INCL CAD: CPT

## 2025-07-16 PROCEDURE — 76830 TRANSVAGINAL US NON-OB: CPT | Mod: 26

## 2025-07-16 PROCEDURE — 77063 BREAST TOMOSYNTHESIS BI: CPT

## 2025-07-18 ENCOUNTER — NON-APPOINTMENT (OUTPATIENT)
Age: 55
End: 2025-07-18